# Patient Record
Sex: MALE | Race: WHITE | NOT HISPANIC OR LATINO | Employment: FULL TIME | URBAN - METROPOLITAN AREA
[De-identification: names, ages, dates, MRNs, and addresses within clinical notes are randomized per-mention and may not be internally consistent; named-entity substitution may affect disease eponyms.]

---

## 2018-03-16 ENCOUNTER — PROCEDURE VISIT (OUTPATIENT)
Dept: UROLOGY | Facility: MEDICAL CENTER | Age: 53
End: 2018-03-16
Payer: COMMERCIAL

## 2018-03-16 VITALS
HEIGHT: 72 IN | DIASTOLIC BLOOD PRESSURE: 86 MMHG | WEIGHT: 200 LBS | BODY MASS INDEX: 27.09 KG/M2 | SYSTOLIC BLOOD PRESSURE: 154 MMHG

## 2018-03-16 DIAGNOSIS — N40.0 BPH WITHOUT OBSTRUCTION/LOWER URINARY TRACT SYMPTOMS: ICD-10-CM

## 2018-03-16 DIAGNOSIS — C67.0 MALIGNANT NEOPLASM OF TRIGONE OF BLADDER (HCC): Primary | ICD-10-CM

## 2018-03-16 LAB
SL AMB  POCT GLUCOSE, UA: NEGATIVE
SL AMB LEUKOCYTE ESTERASE,UA: NEGATIVE
SL AMB POCT BILIRUBIN,UA: NEGATIVE
SL AMB POCT BLOOD,UA: NEGATIVE
SL AMB POCT CLARITY,UA: CLEAR
SL AMB POCT COLOR,UA: YELLOW
SL AMB POCT KETONES,UA: NEGATIVE
SL AMB POCT NITRITE,UA: NEGATIVE
SL AMB POCT PH,UA: 7
SL AMB POCT SPECIFIC GRAVITY,UA: 1.01
SL AMB POCT URINE PROTEIN: NEGATIVE
SL AMB POCT UROBILINOGEN: 0.2

## 2018-03-16 PROCEDURE — 52000 CYSTOURETHROSCOPY: CPT | Performed by: UROLOGY

## 2018-03-16 PROCEDURE — 99213 OFFICE O/P EST LOW 20 MIN: CPT | Performed by: UROLOGY

## 2018-03-16 PROCEDURE — 81003 URINALYSIS AUTO W/O SCOPE: CPT | Performed by: UROLOGY

## 2018-03-16 NOTE — PROGRESS NOTES
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    Plan:  1  Fulguration of tumor using instillation of local anesthesia (Xylocaine 1%, 30 mL) into the bladder       No problem-specific Assessment & Plan notes found for this encounter  Diagnoses and all orders for this visit:    Malignant neoplasm of trigone of bladder ([facility] [address] )  -     POCT urine dip auto non-scope          Subjective:      Patient ID: Enriqueta Hill is a 45 y o  male  HPI    Transitional cell carcinoma of the urinary bladder:  The patient has a history of superficial well-differentiated transitional cell carcinoma  His 1st tumor current in 2015  He had a few tiny recurrences a year ago and on cystoscopy today has another very small recurrence  The patient has no signs or symptoms of recurrent tumor  He has no urinary complaints  A cystoscopy report appears below  BPH:  The patient has no significant urinary complaints  He has not had any problems with bleeding, infection or incontinence  He is not taking any medication for relief of urinary symptoms  The following portions of the patient's history were reviewed and updated as appropriate: allergies, current medications, past family history, past medical history, past social history, past surgical history and problem list     Review of Systems   Constitutional: Negative for activity change and fatigue  Respiratory: Negative for shortness of breath and wheezing  Cardiovascular: Negative for chest pain  Gastrointestinal: Negative for abdominal pain  Genitourinary: Negative for difficulty urinating, dysuria, frequency, hematuria and urgency  Musculoskeletal: Negative for back pain and gait problem  Skin: Negative  Allergic/Immunologic: Negative  Neurological: Negative  Psychiatric/Behavioral: Negative            Objective:      /86 (BP Location: Left arm, Patient Position: Sitting, Cuff Size: Standard)   Ht 6' (1 829 m)   Wt 90 7 kg (200 lb)   BMI 27 12 kg/m²          Physical Exam   Constitutional: He is oriented to person, place, and time  He appears well-developed and well-nourished  HENT:   Head: Normocephalic and atraumatic  Neck: Normal range of motion  Neck supple  Pulmonary/Chest: Effort normal    Musculoskeletal: Normal range of motion  Neurological: He is alert and oriented to person, place, and time  He has normal reflexes  Skin: Skin is warm and dry  Psychiatric: He has a normal mood and affect  His behavior is normal  Judgment and thought content normal              MALE FLEXIBLE CYSTOSCOPY    The patient was prepped and draped in sterile and 10 mL of 1% xylocaine jelly was instilled into the urethra  A penile clamp was applied        Penile Urethra:normal  Bulbar Urethra:normal  Prostate:  Not occlusive  Bladder:        Bladder neck normal       Ureteral orifices normal       Mucosa 2 mm polypoid recurrence on the floor of the bladder in the midline well behind the interureteric ridge       Trabeculation minimal    Impression:  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

## 2018-03-16 NOTE — PROGRESS NOTES
IPSS Questionnaire (AUA-7): Over the past month    1)  How often have you had a sensation of not emptying your bladder completely after you finish urinating? 0 - Not at all   2)  How often have you had to urinate again less than two hours after you finished urinating? 1 - Less than 1 time in 5   3)  How often have you found you stopped and started again several times when you urinated? 0 - Not at all   4) How difficult have you found it to postpone urination? 0 - Not at all   5) How often have you had a weak urinary stream?  0 - Not at all   6) How often have you had to push or strain to begin urination? 0 - Not at all   7) How many times did you most typically get up to urinate from the time you went to bed until the time you got up in the morning? 1 - 1 time   Total Score:  2     QOL: Delighted

## 2018-03-16 NOTE — LETTER
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    Plan:  1  Fulguration of tumor using instillation of local anesthesia (Xylocaine 1%, 30 mL) into the bladder       No problem-specific Assessment & Plan notes found for this encounter  Diagnoses and all orders for this visit:    Malignant neoplasm of trigone of bladder ([facility] [address] )  -     POCT urine dip auto non-scope          Subjective:      Patient ID: Enriqueta Hill is a 45 y o  male  HPI    The patient has a history of superficial well-differentiated transitional cell carcinoma  His 1st tumor current in 2015  He had a few tiny recurrences a year ago and on cystoscopy today has another very small recurrence  The patient has no signs or symptoms of recurrent tumor  He has no urinary complaints  A cystoscopy report appears below  The following portions of the patient's history were reviewed and updated as appropriate: allergies, current medications, past family history, past medical history, past social history, past surgical history and problem list     Review of Systems   Constitutional: Negative for activity change and fatigue  Respiratory: Negative for shortness of breath and wheezing  Cardiovascular: Negative for chest pain  Gastrointestinal: Negative for abdominal pain     Genitourinary: Negative for difficulty urinating, dysuria, frequency, hematuria and urgency  Musculoskeletal: Negative for back pain and gait problem  Skin: Negative  Allergic/Immunologic: Negative  Neurological: Negative  Psychiatric/Behavioral: Negative  Objective:      /86 (BP Location: Left arm, Patient Position: Sitting, Cuff Size: Standard)   Ht 6' (1 829 m)   Wt 90 7 kg (200 lb)   BMI 27 12 kg/m²           Physical Exam   Constitutional: He is oriented to person, place, and time  He appears well-developed and well-nourished  HENT:   Head: Normocephalic and atraumatic  Neck: Normal range of motion  Neck supple  Pulmonary/Chest: Effort normal    Musculoskeletal: Normal range of motion  Neurological: He is alert and oriented to person, place, and time  He has normal reflexes  Skin: Skin is warm and dry  Psychiatric: He has a normal mood and affect  His behavior is normal  Judgment and thought content normal              MALE FLEXIBLE CYSTOSCOPY    The patient was prepped and draped in sterile and 10 mL of 1% xylocaine jelly was instilled into the urethra  A penile clamp was applied        Penile Urethra:normal  Bulbar Urethra:normal  Prostate:  Not occlusive  Bladder:        Bladder neck normal       Ureteral orifices normal       Mucosa 2 mm polypoid recurrence on the floor of the bladder in the midline well behind the interureteric ridge       Trabeculation minimal    Impression:  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

## 2018-04-27 ENCOUNTER — PROCEDURE VISIT (OUTPATIENT)
Dept: UROLOGY | Facility: MEDICAL CENTER | Age: 53
End: 2018-04-27
Payer: COMMERCIAL

## 2018-04-27 VITALS
BODY MASS INDEX: 27.09 KG/M2 | DIASTOLIC BLOOD PRESSURE: 84 MMHG | WEIGHT: 200 LBS | SYSTOLIC BLOOD PRESSURE: 138 MMHG | HEIGHT: 72 IN

## 2018-04-27 DIAGNOSIS — C67.0 MALIGNANT NEOPLASM OF TRIGONE OF URINARY BLADDER (HCC): Primary | ICD-10-CM

## 2018-04-27 LAB
SL AMB  POCT GLUCOSE, UA: NEGATIVE
SL AMB LEUKOCYTE ESTERASE,UA: NEGATIVE
SL AMB POCT BILIRUBIN,UA: NEGATIVE
SL AMB POCT BLOOD,UA: NEGATIVE
SL AMB POCT CLARITY,UA: CLEAR
SL AMB POCT COLOR,UA: YELLOW
SL AMB POCT KETONES,UA: NEGATIVE
SL AMB POCT NITRITE,UA: NEGATIVE
SL AMB POCT PH,UA: 5.5
SL AMB POCT SPECIFIC GRAVITY,UA: <=1.005
SL AMB POCT URINE PROTEIN: NEGATIVE
SL AMB POCT UROBILINOGEN: 0.2

## 2018-04-27 PROCEDURE — 52224 CYSTOSCOPY AND TREATMENT: CPT | Performed by: UROLOGY

## 2018-04-27 PROCEDURE — 81003 URINALYSIS AUTO W/O SCOPE: CPT | Performed by: UROLOGY

## 2018-04-27 NOTE — LETTER
April 27, 2018     MD Bren [address]oto [address] [address]    Patient: Vanita Armando   YOB: 1965   Date of Visit: 4/27/2018       Dear Dr Saran Krause: Thank you for referring Vanita Armando to me for evaluation  Below are my notes for this consultation  If you have questions, please do not hesitate to call me  I look forward to following your patient along with you  Sincerely,        Madisyn Davila MD        CC: No Recipients  Madisyn Davila MD  4/27/2018  3:13 PM  Sign at close encounter  Assessment/Plan:    Assessment:  1  Recurrent bladder cancer    Plan:  1  Return in 3 months for surveillance cystoscopy and reassessment of bladder cancer    No problem-specific Assessment & Plan notes found for this encounter  Diagnoses and all orders for this visit:    Malignant neoplasm of trigone of urinary bladder (HCC)  -     POCT urine dip auto non-scope          Subjective:      Patient ID: Vanita Armando is a 52 y o  male  HPI    The following portions of the patient's history were reviewed and updated as appropriate: allergies, current medications, past family history, past medical history, past social history, past surgical history and problem list     Review of Systems      Objective:      /84 (BP Location: Left arm, Patient Position: Sitting, Cuff Size: Standard)   Ht 6' (1 829 m)   Wt 90 7 kg (200 lb)   BMI 27 12 kg/m²           Physical Exam      Madisyn Davila MD  4/27/2018  3:12 PM  Sign at close encounter  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    Postoperative diagnosis:  Same    Operation cystoscopy and fulguration of recurrent bladder tumor    Surgeon Mesfin Patino MD, FACS    Anesthesia:  Local      The patient was brought to the procedure room and identified  He was placed in lithotomy position  He was prepped and draped in sterile fashion    The bladder was instilled with local anesthesia  The penile clamp was applied  After satisfactory time interval for the local anesthesia were, a flexible cystoscopy was carried out  The urethra was normal   The prostate showed minimal obstruction  Inspection of the interior of the bladder disclosed a normal trigone  There was a 2 mm papillary tumor just behind the interureteric ridge in the midline  Using the Bugbee electrode, the polypoid lesion was vaporized  The procedure was completed  The patient tolerated well  There was no blood loss  He was sent home in satisfactory condition

## 2018-04-27 NOTE — PROGRESS NOTES
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    Postoperative diagnosis:  Same    Operation cystoscopy and fulguration of recurrent bladder tumor    Surgeon Netta Guzman MD, [facility]    Anesthesia:  Local      The patient was brought to the procedure room and identified  He was placed in lithotomy position  He was prepped and draped in sterile fashion  The bladder was instilled with local anesthesia  The penile clamp was applied  After satisfactory time interval for the local anesthesia were, a flexible cystoscopy was carried out  The urethra was normal   The prostate showed minimal obstruction  Inspection of the interior of the bladder disclosed a normal trigone  There was a 2 mm papillary tumor just behind the interureteric ridge in the midline  Using the Bugbee electrode, the polypoid lesion was vaporized  The procedure was completed  The patient tolerated well  There was no blood loss  He was sent home in satisfactory condition

## 2018-04-27 NOTE — PATIENT INSTRUCTIONS
Bladder Cancer   AMBULATORY CARE:   Bladder cancer  starts in the cells that line your bladder  Common symptoms include the following:   · Blood in your urine or urine that is pink or orange    · A sudden need to urinate, or urinating more often than usual    · Trouble starting the stream of urine or urinating very little    · Pain or burning when you urinate    · Pain in your abdomen or pelvis     · Unexplained weight loss    · Feeling tired or weak  Call 911 for any of the following:   · You suddenly feel lightheaded and short of breath  · You cough up blood  Seek care immediately if:   · Your arm or leg feels warm, tender, and painful  It may look swollen and red  · You are unable to urinate  Contact your healthcare provider or oncologist if:   · You have a fever  · You vomit and cannot keep any liquids or food down  · You have new or worsening pain  · Your pain gets worse or does not go away after you take pain medicine  · You have questions or concerns about your condition or care  Treatment for bladder cancer  may include any of the following:  · Transurethral resection of bladder tumor (TURBT)  is a procedure used to remove the tumor  Bladder muscle near the tumor may also be removed  This procedure is done by inserting tools through your urethra and into your bladder  · Immunotherapy  is medicine given to help your immune system kill cancer cells  It is injected into your vein or directly into your bladder  · Chemotherapy  is medicine given to kill cancer cells  It may be given to you as a pill or an injection into your vein or muscle  It may also be injected directly into your bladder  · Radiation therapy  uses high-energy x-ray beams to kill cancer cells  · Surgery  may be needed to remove your bladder  Surrounding organs and lymph nodes may also be removed  Self-care:   · Do not smoke    Nicotine can damage blood vessels and make it hard to manage your bladder cancer  Smoking also increases your risk for new or returning cancer  Ask your healthcare provider for information if you currently smoke and need help to quit  E-cigarettes or smokeless tobacco still contain nicotine  Talk to your healthcare provider before you use these products  · Limit or do not drink alcohol  Alcohol may cause you to become dehydrated  Ask your oncologist if it is safe for you to drink alcohol, and how much is safe to drink  · Eat healthy foods  Healthy foods include fruit, vegetables, whole-grain breads, low-fat dairy products, beans, lean meats, and fish  Your healthcare provider may recommend that you eat less red meat  You need to eat enough calories to help prevent weight loss and increase your energy level  You also need protein to give you strength  If you do not feel hungry, eat small amounts often instead of large meals  · Drink liquids as directed  You may need to drink more liquids than usual to prevent dehydration  Ask how much liquid to drink each day and which liquids are best for you  · Exercise as directed  Exercise may help increase your energy level and appetite  Ask your healthcare provider how much exercise you need and which exercises are best for you  For more information and support: It may be difficult for you and your family to go through cancer and cancer treatments  Join a support group or talk with others who have gone through treatment  · [address] [place] [address]  Phone: 7- [phone]  Web Address: http://[employer]/  [employer]  · [address] [place] [address]  Phone: 0- 995 - [phone]  Web Address: http://[employer]/  gov  Follow up with your healthcare provider or oncologist as directed: You will need to see your oncologist for ongoing treatment  Write down your questions so you remember to ask them during your visits    © 2017 [facility] is for End User's use only and may not be sold, redistributed or otherwise used for commercial purposes  All illustrations and images included in CareNotes® are the copyrighted property of A D A M , Inc  or [employer]  The above information is an  only  It is not intended as medical advice for individual conditions or treatments  Talk to your doctor, nurse or pharmacist before following any medical regimen to see if it is safe and effective for you

## 2018-04-27 NOTE — PROGRESS NOTES
Assessment/Plan:    Assessment:  1  Recurrent bladder cancer    Plan:  1  Return in 3 months for surveillance cystoscopy and reassessment of bladder cancer    No problem-specific Assessment & Plan notes found for this encounter  Diagnoses and all orders for this visit:    Malignant neoplasm of trigone of urinary bladder (HCC)  -     POCT urine dip auto non-scope          Subjective:      Patient ID: Dinora Lim is a 52 y o  male      HPI    The following portions of the patient's history were reviewed and updated as appropriate: allergies, current medications, past family history, past medical history, past social history, past surgical history and problem list     Review of Systems      Objective:      /84 (BP Location: Left arm, Patient Position: Sitting, Cuff Size: Standard)   Ht 6' (1 829 m)   Wt 90 7 kg (200 lb)   BMI 27 12 kg/m²          Physical Exam

## 2018-07-26 ENCOUNTER — PROCEDURE VISIT (OUTPATIENT)
Dept: UROLOGY | Facility: MEDICAL CENTER | Age: 53
End: 2018-07-26
Payer: COMMERCIAL

## 2018-07-26 VITALS
WEIGHT: 204 LBS | BODY MASS INDEX: 27.63 KG/M2 | DIASTOLIC BLOOD PRESSURE: 86 MMHG | SYSTOLIC BLOOD PRESSURE: 158 MMHG | HEIGHT: 72 IN

## 2018-07-26 DIAGNOSIS — Z71.89 OTHER SPECIFIED COUNSELING: ICD-10-CM

## 2018-07-26 DIAGNOSIS — C67.0 MALIGNANT NEOPLASM OF TRIGONE OF URINARY BLADDER (HCC): Primary | ICD-10-CM

## 2018-07-26 PROBLEM — D49.4 BLADDER TUMOR: Status: ACTIVE | Noted: 2018-07-26

## 2018-07-26 LAB
SL AMB  POCT GLUCOSE, UA: NEGATIVE
SL AMB LEUKOCYTE ESTERASE,UA: NEGATIVE
SL AMB POCT BILIRUBIN,UA: NEGATIVE
SL AMB POCT BLOOD,UA: NEGATIVE
SL AMB POCT CLARITY,UA: CLEAR
SL AMB POCT COLOR,UA: YELLOW
SL AMB POCT KETONES,UA: NEGATIVE
SL AMB POCT NITRITE,UA: NEGATIVE
SL AMB POCT PH,UA: 6
SL AMB POCT SPECIFIC GRAVITY,UA: 1.01
SL AMB POCT URINE PROTEIN: NEGATIVE
SL AMB POCT UROBILINOGEN: 0.2

## 2018-07-26 PROCEDURE — 52000 CYSTOURETHROSCOPY: CPT | Performed by: UROLOGY

## 2018-07-26 PROCEDURE — 99213 OFFICE O/P EST LOW 20 MIN: CPT | Performed by: UROLOGY

## 2018-07-26 PROCEDURE — 81003 URINALYSIS AUTO W/O SCOPE: CPT | Performed by: UROLOGY

## 2018-07-26 NOTE — PROGRESS NOTES
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    Operation:  Flexible cystoscopy    Surgeon:  Nathalie Pizano MD,[facility]    Anesthesia:  Xylocaine jelly    Procedure:  Patient was brought to the cystoscopy room and positively identified  The patient was prepped and draped in sterile fashion  Ten mL of 1% xylocaine jelly was instilled into the urethra  A penile clamp was applied  The flexible cystoscope was inserted  Findings are as follows:    Penile Urethra:normal  Bulbar Urethra:normal  Prostate:   Minimally occlusive  Bladder: The bladder neck is normal  Ureteral orifices are in normal  position  The mucosa is   Normal except for 3 papillary tumors near the midline between the bladder neck in interureteric ridge  The largest of these is approximately 5 mm      There is mild trabeculation  The cystoscope was removed  The patient tolerated the procedure well  Following additional consultation to review the findings with the patient, he was discharged from the office in satisfactory condition  Impression:  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

## 2018-07-26 NOTE — H&P
IPSS Questionnaire (AUA-7): Over the past month    1)  How often have you had a sensation of not emptying your bladder completely after you finish urinating? 0 - Not at all   2)  How often have you had to urinate again less than two hours after you finished urinating? 1 - Less than 1 time in 5   3)  How often have you found you stopped and started again several times when you urinated? 0 - Not at all   4) How difficult have you found it to postpone urination? 0 - Not at all   5) How often have you had a weak urinary stream?  0 - Not at all   6) How often have you had to push or strain to begin urination? 0 - Not at all   7) How many times did you most typically get up to urinate from the time you went to bed until the time you got up in the morning? 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    Operation:  Flexible cystoscopy    Surgeon:  Dorian Gosselin, MD,FACS    Anesthesia:  Xylocaine jelly    Procedure:  Patient was brought to the cystoscopy room and positively identified  The patient was prepped and draped in sterile fashion  Ten mL of 1% xylocaine jelly was instilled into the urethra  A penile clamp was applied  The flexible cystoscope was inserted  Findings are as follows:    Penile Urethra:normal  Bulbar Urethra:normal  Prostate:   Minimally occlusive  Bladder: The bladder neck is normal  Ureteral orifices are in normal  position  The mucosa is   Normal except for 3 papillary tumors near the midline between the bladder neck in interureteric ridge  The largest of these is approximately 5 mm      There is mild trabeculation  The cystoscope was removed  The patient tolerated the procedure well    Following additional consultation to review the findings with the patient, he was discharged from the office in satisfactory condition  Impression:  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  Assessment/Plan:    Malignant neoplasm of trigone of urinary bladder ([address] )   The patient has several small recurrent bladder tumors  He will be scheduled for bladder biopsy and fulguration  Following recovery, we will discuss BCG treatment  Diagnoses and all orders for this visit:    Malignant neoplasm of trigone of urinary bladder (HCC)  -     POCT urine dip auto non-scope    Other specified counseling          Subjective:      Patient ID: Hailey Montero is a 52 y o  male  HPI   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:   The patient returns for surveillance cystoscopy today  He has 3 small recurrences  Three months ago, a single tiny recurrence was fulgurated in the office  These lesions are a bit larger in because your more numerous, I think it would be easier on the patient to perform the bladder biopsy and fulguration in the hospital under anesthesia  The patient is recurrences are becoming more frequent and more numerous  Following treatment for the current back, we will discuss a course of BCG  The following portions of the patient's history were reviewed and updated as appropriate: allergies, current medications, past family history, past medical history, past social history, past surgical history and problem list     Review of Systems   Constitutional: Negative for activity change and fatigue  Respiratory: Negative for shortness of breath and wheezing  Cardiovascular: Negative for chest pain  Gastrointestinal: Negative for abdominal pain  Genitourinary: Negative for difficulty urinating, dysuria, frequency, hematuria and urgency  Musculoskeletal: Negative for back pain and gait problem  Skin: Negative  Allergic/Immunologic: Negative  Neurological: Negative  Psychiatric/Behavioral: Negative  Objective:      /86 (BP Location: Left arm, Patient Position: Sitting, Cuff Size: Standard)   Ht 6' (1 829 m)   Wt 92 5 kg (204 lb)   BMI 27 67 kg/m²           Physical Exam   Constitutional: He is oriented to person, place, and time  He appears well-developed and well-nourished  HENT:   Head: Normocephalic and atraumatic  Eyes: EOM are normal    Neck: Normal range of motion  Neck supple  Cardiovascular: Normal rate, regular rhythm and normal heart sounds  Pulmonary/Chest: Effort normal and breath sounds normal    Abdominal: Soft  There is no tenderness  Genitourinary: Penis normal    Genitourinary Comments:   External genitalia normal   Musculoskeletal: Normal range of motion  Neurological: He is alert and oriented to person, place, and time  Skin: Skin is warm and dry  Psychiatric: He has a normal mood and affect   His behavior is normal  Judgment and thought content normal

## 2018-07-26 NOTE — LETTER
July 26, 2018     Christine Liao MD  [facility] [address]    Patient: Robinson Jackson   YOB: 1965   Date of Visit: 7/26/2018       Dear Dr Giuliano Cha: Thank you for referring Robinson Jackson to me for evaluation  Below are my notes for this consultation  If you have questions, please do not hesitate to call me  I look forward to following your patient along with you  Sincerely,        Gloria Martin MD        CC: No Recipients  Gloria Martin MD  7/26/2018 12:15 PM  Sign at close encounter  Assessment/Plan:    Malignant neoplasm of trigone of urinary bladder 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:   The patient returns for surveillance cystoscopy today  He has 3 small recurrences  Three months ago, a single tiny recurrence was fulgurated in the office  These lesions are a bit larger in because your more numerous, I think it would be easier on the patient to perform the bladder biopsy and fulguration in the hospital under anesthesia  The patient is recurrences are becoming more frequent and more numerous  Following treatment for the current back, we will discuss a course of BCG  The following portions of the patient's history were reviewed and updated as appropriate: allergies, current medications, past family history, past medical history, past social history, past surgical history and problem list     Review of Systems   Constitutional: Negative for activity change and fatigue     Respiratory: Negative for shortness of breath and wheezing  Cardiovascular: Negative for chest pain  Gastrointestinal: Negative for abdominal pain  Genitourinary: Negative for difficulty urinating, dysuria, frequency, hematuria and urgency  Musculoskeletal: Negative for back pain and gait problem  Skin: Negative  Allergic/Immunologic: Negative  Neurological: Negative  Psychiatric/Behavioral: Negative  Objective:      /86 (BP Location: Left arm, Patient Position: Sitting, Cuff Size: Standard)   Ht 6' (1 829 m)   Wt 92 5 kg (204 lb)   BMI 27 67 kg/m²           Physical Exam   Constitutional: He is oriented to person, place, and time  He appears well-developed and well-nourished  HENT:   Head: Normocephalic and atraumatic  Eyes: EOM are normal    Neck: Normal range of motion  Neck supple  Cardiovascular: Normal rate, regular rhythm and normal heart sounds  Pulmonary/Chest: Effort normal and breath sounds normal    Abdominal: Soft  There is no tenderness  Genitourinary: Penis normal    Genitourinary Comments:   External genitalia normal   Musculoskeletal: Normal range of motion  Neurological: He is alert and oriented to person, place, and time  Skin: Skin is warm and dry  Psychiatric: He has a normal mood and affect  His behavior is normal  Judgment and thought content normal          Sunday MD Abiola  7/26/2018 11:26 AM  Sign at close encounter  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    Operation:  Flexible cystoscopy    Surgeon:  Jose G Sawyer MD,FACS    Anesthesia:  Xylocaine jelly    Procedure:  Patient was brought to the cystoscopy room and positively identified  The patient was prepped and draped in sterile fashion  Ten mL of 1% xylocaine jelly was instilled into the urethra    A penile clamp was applied  The flexible cystoscope was inserted  Findings are as follows:    Penile Urethra:normal  Bulbar Urethra:normal  Prostate:   Minimally occlusive  Bladder: The bladder neck is normal  Ureteral orifices are in normal  position  The mucosa is   Normal except for 3 papillary tumors near the midline between the bladder neck in interureteric ridge  The largest of these is approximately 5 mm      There is mild trabeculation  The cystoscope was removed  The patient tolerated the procedure well  Following additional consultation to review the findings with the patient, he was discharged from the office in satisfactory condition  Impression:  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

## 2018-07-26 NOTE — ASSESSMENT & PLAN NOTE
The patient has several small recurrent bladder tumors  He will be scheduled for bladder biopsy and fulguration  Following recovery, we will discuss BCG treatment

## 2018-07-26 NOTE — PROGRESS NOTES
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:   The patient returns for surveillance cystoscopy today  He has 3 small recurrences  Three months ago, a single tiny recurrence was fulgurated in the office  These lesions are a bit larger in because your more numerous, I think it would be easier on the patient to perform the bladder biopsy and fulguration in the hospital under anesthesia  The patient is recurrences are becoming more frequent and more numerous  Following treatment for the current back, we will discuss a course of BCG  The following portions of the patient's history were reviewed and updated as appropriate: allergies, current medications, past family history, past medical history, past social history, past surgical history and problem list     Review of Systems   Constitutional: Negative for activity change and fatigue  Respiratory: Negative for shortness of breath and wheezing  Cardiovascular: Negative for chest pain  Gastrointestinal: Negative for abdominal pain  Genitourinary: Negative for difficulty urinating, dysuria, frequency, hematuria and urgency  Musculoskeletal: Negative for back pain and gait problem  Skin: Negative  Allergic/Immunologic: Negative  Neurological: Negative  Psychiatric/Behavioral: Negative            Objective:      /86 (BP Location: Left arm, Patient Position: Sitting, Cuff Size: Standard)   Ht 6' (1 829 m)   Wt 92 5 kg (204 lb) BMI 27 67 kg/m²          Physical Exam   Constitutional: He is oriented to person, place, and time  He appears well-developed and well-nourished  HENT:   Head: Normocephalic and atraumatic  Eyes: EOM are normal    Neck: Normal range of motion  Neck supple  Cardiovascular: Normal rate, regular rhythm and normal heart sounds  Pulmonary/Chest: Effort normal and breath sounds normal    Abdominal: Soft  There is no tenderness  Genitourinary: Penis normal    Genitourinary Comments:   External genitalia normal   Musculoskeletal: Normal range of motion  Neurological: He is alert and oriented to person, place, and time  Skin: Skin is warm and dry  Psychiatric: He has a normal mood and affect   His behavior is normal  Judgment and thought content normal

## 2018-08-07 ENCOUNTER — ANESTHESIA EVENT (OUTPATIENT)
Dept: PERIOP | Facility: HOSPITAL | Age: 53
End: 2018-08-07
Payer: COMMERCIAL

## 2018-08-07 RX ORDER — SODIUM CHLORIDE 9 MG/ML
125 INJECTION, SOLUTION INTRAVENOUS CONTINUOUS
Status: CANCELLED | OUTPATIENT
Start: 2018-08-07 | End: 2019-08-07

## 2018-08-08 ENCOUNTER — APPOINTMENT (OUTPATIENT)
Dept: PREADMISSION TESTING | Facility: HOSPITAL | Age: 53
End: 2018-08-08
Payer: COMMERCIAL

## 2018-08-08 ENCOUNTER — APPOINTMENT (OUTPATIENT)
Dept: LAB | Facility: HOSPITAL | Age: 53
End: 2018-08-08
Attending: UROLOGY
Payer: COMMERCIAL

## 2018-08-08 ENCOUNTER — HOSPITAL ENCOUNTER (OUTPATIENT)
Dept: NON INVASIVE DIAGNOSTICS | Facility: HOSPITAL | Age: 53
Discharge: HOME | End: 2018-08-08
Attending: UROLOGY
Payer: COMMERCIAL

## 2018-08-08 DIAGNOSIS — D49.4 BLADDER TUMOR: ICD-10-CM

## 2018-08-08 LAB
ALBUMIN SERPL BCP-MCNC: 4.3 G/DL (ref 3.5–5)
ALP SERPL-CCNC: 44 U/L (ref 46–116)
ALT SERPL W P-5'-P-CCNC: 58 U/L (ref 12–78)
ANION GAP SERPL CALCULATED.3IONS-SCNC: 7 MMOL/L (ref 4–13)
APTT PPP: 26 SECONDS (ref 24–36)
AST SERPL W P-5'-P-CCNC: 34 U/L (ref 5–45)
BASOPHILS # BLD AUTO: 0.03 THOUSANDS/ΜL (ref 0–0.1)
BASOPHILS NFR BLD AUTO: 0 % (ref 0–1)
BILIRUB SERPL-MCNC: 0.46 MG/DL (ref 0.2–1)
BUN SERPL-MCNC: 25 MG/DL (ref 5–25)
CALCIUM SERPL-MCNC: 9.6 MG/DL (ref 8.3–10.1)
CHLORIDE SERPL-SCNC: 100 MMOL/L (ref 100–108)
CO2 SERPL-SCNC: 29 MMOL/L (ref 21–32)
CREAT SERPL-MCNC: 1.16 MG/DL (ref 0.6–1.3)
EOSINOPHIL # BLD AUTO: 0.1 THOUSAND/ΜL (ref 0–0.61)
EOSINOPHIL NFR BLD AUTO: 2 % (ref 0–6)
ERYTHROCYTE [DISTWIDTH] IN BLOOD BY AUTOMATED COUNT: 12.8 % (ref 11.6–15.1)
GFR SERPL CREATININE-BSD FRML MDRD: 70 ML/MIN/1.73SQ M
GLUCOSE SERPL-MCNC: 111 MG/DL (ref 65–140)
HCT VFR BLD AUTO: 40.7 % (ref 36.5–49.3)
HGB BLD-MCNC: 13.8 G/DL (ref 12–17)
INR PPP: 1.04 (ref 0.86–1.17)
LYMPHOCYTES # BLD AUTO: 1.76 THOUSANDS/ΜL (ref 0.6–4.47)
LYMPHOCYTES NFR BLD AUTO: 26 % (ref 14–44)
MCH RBC QN AUTO: 31 PG (ref 26.8–34.3)
MCHC RBC AUTO-ENTMCNC: 33.9 G/DL (ref 31.4–37.4)
MCV RBC AUTO: 92 FL (ref 82–98)
MONOCYTES # BLD AUTO: 0.68 THOUSAND/ΜL (ref 0.17–1.22)
MONOCYTES NFR BLD AUTO: 10 % (ref 4–12)
NEUTROPHILS # BLD AUTO: 4.31 THOUSANDS/ΜL (ref 1.85–7.62)
NEUTS SEG NFR BLD AUTO: 63 % (ref 43–75)
NRBC BLD AUTO-RTO: 0 /100 WBCS
PLATELET # BLD AUTO: 259 THOUSANDS/UL (ref 149–390)
PMV BLD AUTO: 10.9 FL (ref 8.9–12.7)
POTASSIUM SERPL-SCNC: 4.1 MMOL/L (ref 3.5–5.3)
PROT SERPL-MCNC: 7.8 G/DL (ref 6.4–8.2)
PROTHROMBIN TIME: 13.7 SECONDS (ref 11.8–14.2)
RBC # BLD AUTO: 4.45 MILLION/UL (ref 3.88–5.62)
SODIUM SERPL-SCNC: 136 MMOL/L (ref 136–145)
WBC # BLD AUTO: 6.88 THOUSAND/UL (ref 4.31–10.16)

## 2018-08-08 PROCEDURE — 36415 COLL VENOUS BLD VENIPUNCTURE: CPT

## 2018-08-08 PROCEDURE — 85730 THROMBOPLASTIN TIME PARTIAL: CPT

## 2018-08-08 PROCEDURE — 80053 COMPREHEN METABOLIC PANEL: CPT

## 2018-08-08 PROCEDURE — 93005 ELECTROCARDIOGRAM TRACING: CPT | Performed by: UROLOGY

## 2018-08-08 PROCEDURE — 87086 URINE CULTURE/COLONY COUNT: CPT

## 2018-08-08 PROCEDURE — 85610 PROTHROMBIN TIME: CPT

## 2018-08-08 PROCEDURE — 85025 COMPLETE CBC W/AUTO DIFF WBC: CPT

## 2018-08-08 RX ORDER — SODIUM CHLORIDE 9 MG/ML
125 INJECTION, SOLUTION INTRAVENOUS CONTINUOUS
Status: CANCELLED | OUTPATIENT
Start: 2018-08-16 | End: 2019-08-16

## 2018-08-08 NOTE — ANESTHESIA PREPROCEDURE EVALUATION
Review of Systems/Medical History  Patient summary reviewed        Cardiovascular  Negative cardio ROS    Pulmonary  Negative pulmonary ROS        GI/Hepatic  Negative GI/hepatic ROS   GERD well controlled,        Negative  ROS        Endo/Other  Negative endo/other ROS      GYN  Negative gynecology ROS          Hematology  Negative hematology ROS      Musculoskeletal  Negative musculoskeletal ROS        Neurology  Negative neurology ROS      Psychology   Negative psychology ROS              Physical Exam    Airway    Mallampati score: II  TM Distance: >3 FB  Neck ROM: full     Dental   No notable dental hx     Cardiovascular  Comment: Negative ROS, Rhythm: regular, Rate: normal, Cardiovascular exam normal    Pulmonary  Pulmonary exam normal Breath sounds clear to auscultation,     Other Findings        Anesthesia Plan  ASA Score- 2     Anesthesia Type- general with ASA Monitors  Additional Monitors:   Airway Plan: LMA  Plan Factors-    Induction- intravenous  Postoperative Plan-     Informed Consent- Anesthetic plan and risks discussed with patient

## 2018-08-09 LAB
ATRIAL RATE: 60 BPM
P AXIS: 38 DEGREES
PR INTERVAL: 156 MS
QRS AXIS: -8 DEGREES
QRSD INTERVAL: 94 MS
QT INTERVAL: 384 MS
QTC INTERVAL: 384 MS
T WAVE AXIS: -1 DEGREES
VENTRICULAR RATE: 60 BPM

## 2018-08-09 PROCEDURE — 93010 ELECTROCARDIOGRAM REPORT: CPT | Performed by: UROLOGY

## 2018-08-10 LAB — BACTERIA UR CULT: NORMAL

## 2018-08-15 RX ORDER — SODIUM CHLORIDE 9 MG/ML
125 INJECTION, SOLUTION INTRAVENOUS CONTINUOUS
Status: DISCONTINUED | OUTPATIENT
Start: 2018-08-15 | End: 2018-08-15

## 2018-08-16 ENCOUNTER — HOSPITAL ENCOUNTER (OUTPATIENT)
Facility: HOSPITAL | Age: 53
Setting detail: OUTPATIENT SURGERY
Discharge: HOME | End: 2018-08-16
Attending: UROLOGY | Admitting: UROLOGY
Payer: COMMERCIAL

## 2018-08-16 ENCOUNTER — ANESTHESIA (OUTPATIENT)
Dept: PERIOP | Facility: HOSPITAL | Age: 53
End: 2018-08-16
Payer: COMMERCIAL

## 2018-08-16 VITALS
WEIGHT: 210 LBS | TEMPERATURE: 98 F | DIASTOLIC BLOOD PRESSURE: 69 MMHG | RESPIRATION RATE: 15 BRPM | BODY MASS INDEX: 28.44 KG/M2 | HEIGHT: 72 IN | OXYGEN SATURATION: 94 % | SYSTOLIC BLOOD PRESSURE: 115 MMHG | HEART RATE: 68 BPM

## 2018-08-16 DIAGNOSIS — D49.4 BLADDER TUMOR: ICD-10-CM

## 2018-08-16 PROCEDURE — 52224 CYSTOSCOPY AND TREATMENT: CPT | Performed by: UROLOGY

## 2018-08-16 PROCEDURE — 88305 TISSUE EXAM BY PATHOLOGIST: CPT | Performed by: PATHOLOGY

## 2018-08-16 RX ORDER — OXYCODONE HYDROCHLORIDE 5 MG/1
5 TABLET ORAL EVERY 4 HOURS PRN
Status: DISCONTINUED | OUTPATIENT
Start: 2018-08-16 | End: 2018-08-16 | Stop reason: HOSPADM

## 2018-08-16 RX ORDER — ACETAMINOPHEN 325 MG/1
650 TABLET ORAL EVERY 6 HOURS PRN
Status: DISCONTINUED | OUTPATIENT
Start: 2018-08-16 | End: 2018-08-16 | Stop reason: HOSPADM

## 2018-08-16 RX ORDER — KETOROLAC TROMETHAMINE 30 MG/ML
INJECTION, SOLUTION INTRAMUSCULAR; INTRAVENOUS AS NEEDED
Status: DISCONTINUED | OUTPATIENT
Start: 2018-08-16 | End: 2018-08-16 | Stop reason: SURG

## 2018-08-16 RX ORDER — MAGNESIUM HYDROXIDE 1200 MG/15ML
LIQUID ORAL AS NEEDED
Status: DISCONTINUED | OUTPATIENT
Start: 2018-08-16 | End: 2018-08-16 | Stop reason: HOSPADM

## 2018-08-16 RX ORDER — ONDANSETRON 2 MG/ML
4 INJECTION INTRAMUSCULAR; INTRAVENOUS EVERY 6 HOURS PRN
Status: CANCELLED | OUTPATIENT
Start: 2018-08-16

## 2018-08-16 RX ORDER — PHENAZOPYRIDINE HYDROCHLORIDE 200 MG/1
200 TABLET, FILM COATED ORAL
Status: DISCONTINUED | OUTPATIENT
Start: 2018-08-16 | End: 2018-08-16 | Stop reason: HOSPADM

## 2018-08-16 RX ORDER — ONDANSETRON 2 MG/ML
INJECTION INTRAMUSCULAR; INTRAVENOUS AS NEEDED
Status: DISCONTINUED | OUTPATIENT
Start: 2018-08-16 | End: 2018-08-16 | Stop reason: SURG

## 2018-08-16 RX ORDER — SODIUM CHLORIDE 9 MG/ML
125 INJECTION, SOLUTION INTRAVENOUS CONTINUOUS
Status: DISCONTINUED | OUTPATIENT
Start: 2018-08-16 | End: 2018-08-16 | Stop reason: HOSPADM

## 2018-08-16 RX ORDER — PROPOFOL 10 MG/ML
INJECTION, EMULSION INTRAVENOUS AS NEEDED
Status: DISCONTINUED | OUTPATIENT
Start: 2018-08-16 | End: 2018-08-16 | Stop reason: SURG

## 2018-08-16 RX ORDER — MORPHINE SULFATE 2 MG/ML
2 INJECTION, SOLUTION INTRAMUSCULAR; INTRAVENOUS
Status: DISCONTINUED | OUTPATIENT
Start: 2018-08-16 | End: 2018-08-16 | Stop reason: HOSPADM

## 2018-08-16 RX ORDER — FENTANYL CITRATE 50 UG/ML
50 INJECTION, SOLUTION INTRAMUSCULAR; INTRAVENOUS
Status: CANCELLED | OUTPATIENT
Start: 2018-08-16

## 2018-08-16 RX ORDER — MIDAZOLAM HYDROCHLORIDE 1 MG/ML
INJECTION INTRAMUSCULAR; INTRAVENOUS AS NEEDED
Status: DISCONTINUED | OUTPATIENT
Start: 2018-08-16 | End: 2018-08-16 | Stop reason: SURG

## 2018-08-16 RX ORDER — FENTANYL CITRATE/PF 50 MCG/ML
25 SYRINGE (ML) INJECTION
Status: DISCONTINUED | OUTPATIENT
Start: 2018-08-16 | End: 2018-08-16 | Stop reason: HOSPADM

## 2018-08-16 RX ORDER — ONDANSETRON 2 MG/ML
4 INJECTION INTRAMUSCULAR; INTRAVENOUS ONCE
Status: DISCONTINUED | OUTPATIENT
Start: 2018-08-16 | End: 2018-08-16 | Stop reason: HOSPADM

## 2018-08-16 RX ORDER — ONDANSETRON 2 MG/ML
4 INJECTION INTRAMUSCULAR; INTRAVENOUS EVERY 6 HOURS PRN
Status: DISCONTINUED | OUTPATIENT
Start: 2018-08-16 | End: 2018-08-16 | Stop reason: HOSPADM

## 2018-08-16 RX ORDER — FENTANYL CITRATE 50 UG/ML
INJECTION, SOLUTION INTRAMUSCULAR; INTRAVENOUS AS NEEDED
Status: DISCONTINUED | OUTPATIENT
Start: 2018-08-16 | End: 2018-08-16 | Stop reason: SURG

## 2018-08-16 RX ORDER — KETOROLAC TROMETHAMINE 30 MG/ML
15 INJECTION, SOLUTION INTRAMUSCULAR; INTRAVENOUS EVERY 6 HOURS SCHEDULED
Status: DISCONTINUED | OUTPATIENT
Start: 2018-08-16 | End: 2018-08-16 | Stop reason: HOSPADM

## 2018-08-16 RX ORDER — GLYCOPYRROLATE 0.2 MG/ML
INJECTION INTRAMUSCULAR; INTRAVENOUS AS NEEDED
Status: DISCONTINUED | OUTPATIENT
Start: 2018-08-16 | End: 2018-08-16 | Stop reason: SURG

## 2018-08-16 RX ORDER — PHENAZOPYRIDINE HYDROCHLORIDE 200 MG/1
200 TABLET, FILM COATED ORAL
Qty: 10 TABLET | Refills: 0 | Status: SHIPPED | OUTPATIENT
Start: 2018-08-16 | End: 2018-08-21

## 2018-08-16 RX ADMIN — PROPOFOL 200 MG: 10 INJECTION, EMULSION INTRAVENOUS at 08:34

## 2018-08-16 RX ADMIN — ONDANSETRON HYDROCHLORIDE 4 MG: 2 INJECTION, SOLUTION INTRAVENOUS at 08:54

## 2018-08-16 RX ADMIN — FENTANYL CITRATE 50 MCG: 50 INJECTION, SOLUTION INTRAMUSCULAR; INTRAVENOUS at 08:40

## 2018-08-16 RX ADMIN — CEFAZOLIN SODIUM 2000 MG: 2 SOLUTION INTRAVENOUS at 08:30

## 2018-08-16 RX ADMIN — GLYCOPYRROLATE 0.4 MG: 0.2 INJECTION, SOLUTION INTRAMUSCULAR; INTRAVENOUS at 08:27

## 2018-08-16 RX ADMIN — MIDAZOLAM 2 MG: 1 INJECTION INTRAMUSCULAR; INTRAVENOUS at 08:27

## 2018-08-16 RX ADMIN — KETOROLAC TROMETHAMINE 30 MG: 30 INJECTION, SOLUTION INTRAMUSCULAR at 08:59

## 2018-08-16 RX ADMIN — SODIUM CHLORIDE 125 ML/HR: 0.9 INJECTION, SOLUTION INTRAVENOUS at 09:33

## 2018-08-16 RX ADMIN — SODIUM CHLORIDE 125 ML/HR: 0.9 INJECTION, SOLUTION INTRAVENOUS at 07:10

## 2018-08-16 RX ADMIN — FENTANYL CITRATE 50 MCG: 50 INJECTION, SOLUTION INTRAMUSCULAR; INTRAVENOUS at 08:39

## 2018-08-16 RX ADMIN — PROPOFOL 100 MG: 10 INJECTION, EMULSION INTRAVENOUS at 08:35

## 2018-08-16 RX ADMIN — DEXAMETHASONE SODIUM PHOSPHATE 4 MG: 10 INJECTION INTRAMUSCULAR; INTRAVENOUS at 08:54

## 2018-08-16 NOTE — OP NOTE
OPERATIVE REPORT  PATIENT NAME: Héctor Holloway    :  1965  MRN: [id number]  Pt Location: [facility] OR ROOM 08    SURGERY DATE: 2018    Surgeon(s) and Role:     * Tamanna Carroll MD - Primary    Preop Diagnosis:  Bladder tumor [D49 4]    Post-Op Diagnosis Codes: * Bladder tumor [D49 4]    Procedure(s) (LRB):  CYSTOSCOPY , BLADDER BIOPSIES WITH FULGURATION (N/A)    Specimen(s):  ID Type Source Tests Collected by Time Destination   1 : biopsies of bladder tumors Tissue Urinary Bladder TISSUE EXAM Tamanna Carroll MD 2018 [id number]        Estimated Blood Loss:   Minimal    Drains:  None       Anesthesia Type:   General    Operative Indications:  Bladder tumor [D49 4]      Operative Findings:  Normally urethra, but only bilobar prostatic hypertrophy with an elevated median bar  Prostate a small approximately 15-20 g in size  Ureteral orifices are unremarkable  There is evidence of previous resection around the right ureteral orifice  There are 3 small papillary lesion all less than 0 5 cm on the posterior wall of the bladder  The lesions appear to be low grade  The remainder of the bladder is unremarkable  Complications:   None    Procedure and Technique:  The patient was brought to the operating room properly identified  General anesthesia was administered  He was then placed in lithotomy position and prepped and draped in usual sterile fashion  Compression boots were applied  Intravenous antibiotic was administered  An appropriate time-out was performed  Cystourethroscopy was performed 22 [place] cystoscope  The 70 and 30 degree lenses were imported  Findings are as above  With identification of the bladder lesions, the cold cup biopsy forceps was employed and excision biopsies of all lesions performed  The biopsy sites and surrounding mucosa were then fulgurated with the Bugbee electrode  Hemostasis appeared adequate  No other lesions were identified  Hemostasis was then reconfirmed  The bladder was drained  The cystoscope removed  The patient tolerated procedure well  He was taken to the recovery room in satisfactory condition     I was present for the entire procedure    Patient Disposition:  PACU     SIGNATURE: Rosalina Chandler MD  DATE: August 16, 2018  TIME: 9:18 AM

## 2018-08-16 NOTE — ANESTHESIA POSTPROCEDURE EVALUATION
Post-Op Assessment Note      CV Status:  Stable    Mental Status:  Alert and awake    Hydration Status:  Euvolemic    PONV Controlled:  Controlled    Airway Patency:  Patent    Post Op Vitals Reviewed: Yes          Staff: Anesthesiologist           /68 (08/16/18 0956)    Temp      Pulse 65 (08/16/18 0956)   Resp 13 (08/16/18 0956)    SpO2 94 % (08/16/18 0956)

## 2018-08-16 NOTE — DISCHARGE INSTR - AVS FIRST PAGE
Phenazopyridine can help with urinary burning  It will cause urine to be orange in color-this can sometimes be confused with blood in the urine  The urine will stain  Use Tylenol or ibuprofen for discomfort  Call for fever, abdominal pain, difficulty voiding or hyper urinary bleeding

## 2018-08-16 NOTE — H&P (VIEW-ONLY)
IPSS Questionnaire (AUA-7): Over the past month    1)  How often have you had a sensation of not emptying your bladder completely after you finish urinating? 0 - Not at all   2)  How often have you had to urinate again less than two hours after you finished urinating? 1 - Less than 1 time in 5   3)  How often have you found you stopped and started again several times when you urinated? 0 - Not at all   4) How difficult have you found it to postpone urination? 0 - Not at all   5) How often have you had a weak urinary stream?  0 - Not at all   6) How often have you had to push or strain to begin urination? 0 - Not at all   7) How many times did you most typically get up to urinate from the time you went to bed until the time you got up in the morning? 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    Operation:  Flexible cystoscopy    Surgeon:  Gerald Issa MD,FACS    Anesthesia:  Xylocaine jelly    Procedure:  Patient was brought to the cystoscopy room and positively identified  The patient was prepped and draped in sterile fashion  Ten mL of 1% xylocaine jelly was instilled into the urethra  A penile clamp was applied  The flexible cystoscope was inserted  Findings are as follows:    Penile Urethra:normal  Bulbar Urethra:normal  Prostate:   Minimally occlusive  Bladder: The bladder neck is normal  Ureteral orifices are in normal  position  The mucosa is   Normal except for 3 papillary tumors near the midline between the bladder neck in interureteric ridge  The largest of these is approximately 5 mm      There is mild trabeculation  The cystoscope was removed  The patient tolerated the procedure well    Following additional consultation to review the findings with the patient, he was discharged from the office in satisfactory condition  Impression:  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  Assessment/Plan:    Malignant neoplasm of trigone of urinary bladder ([address] )   The patient has several small recurrent bladder tumors  He will be scheduled for bladder biopsy and fulguration  Following recovery, we will discuss BCG treatment  Diagnoses and all orders for this visit:    Malignant neoplasm of trigone of urinary bladder (HCC)  -     POCT urine dip auto non-scope    Other specified counseling          Subjective:      Patient ID: Elvin Coughlin is a 52 y o  male  HPI   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:   The patient returns for surveillance cystoscopy today  He has 3 small recurrences  Three months ago, a single tiny recurrence was fulgurated in the office  These lesions are a bit larger in because your more numerous, I think it would be easier on the patient to perform the bladder biopsy and fulguration in the hospital under anesthesia  The patient is recurrences are becoming more frequent and more numerous  Following treatment for the current back, we will discuss a course of BCG  The following portions of the patient's history were reviewed and updated as appropriate: allergies, current medications, past family history, past medical history, past social history, past surgical history and problem list     Review of Systems   Constitutional: Negative for activity change and fatigue  Respiratory: Negative for shortness of breath and wheezing  Cardiovascular: Negative for chest pain  Gastrointestinal: Negative for abdominal pain  Genitourinary: Negative for difficulty urinating, dysuria, frequency, hematuria and urgency  Musculoskeletal: Negative for back pain and gait problem  Skin: Negative  Allergic/Immunologic: Negative  Neurological: Negative  Psychiatric/Behavioral: Negative  Objective:      /86 (BP Location: Left arm, Patient Position: Sitting, Cuff Size: Standard)   Ht 6' (1 829 m)   Wt 92 5 kg (204 lb)   BMI 27 67 kg/m²           Physical Exam   Constitutional: He is oriented to person, place, and time  He appears well-developed and well-nourished  HENT:   Head: Normocephalic and atraumatic  Eyes: EOM are normal    Neck: Normal range of motion  Neck supple  Cardiovascular: Normal rate, regular rhythm and normal heart sounds  Pulmonary/Chest: Effort normal and breath sounds normal    Abdominal: Soft  There is no tenderness  Genitourinary: Penis normal    Genitourinary Comments:   External genitalia normal   Musculoskeletal: Normal range of motion  Neurological: He is alert and oriented to person, place, and time  Skin: Skin is warm and dry  Psychiatric: He has a normal mood and affect   His behavior is normal  Judgment and thought content normal

## 2018-08-16 NOTE — DISCHARGE INSTRUCTIONS

## 2018-08-16 NOTE — INTERVAL H&P NOTE
H&P reviewed  After examining the patient I find no changes in the patients condition since the H&P had been written  I reviewed the procedure- risks and benefits with the patient in the holding unit  Mitomycin will not be instilled as the patient is already scheduled to receive BCG

## 2018-08-21 ENCOUNTER — OFFICE VISIT (OUTPATIENT)
Dept: URGENT CARE | Age: 53
End: 2018-08-21
Payer: COMMERCIAL

## 2018-08-21 VITALS
OXYGEN SATURATION: 97 % | SYSTOLIC BLOOD PRESSURE: 142 MMHG | RESPIRATION RATE: 20 BRPM | HEIGHT: 72 IN | TEMPERATURE: 96 F | WEIGHT: 208.2 LBS | DIASTOLIC BLOOD PRESSURE: 89 MMHG | BODY MASS INDEX: 28.2 KG/M2

## 2018-08-21 DIAGNOSIS — S61.012A LACERATION OF LEFT THUMB WITHOUT FOREIGN BODY WITHOUT DAMAGE TO NAIL, INITIAL ENCOUNTER: Primary | ICD-10-CM

## 2018-08-21 PROCEDURE — 12001 RPR S/N/AX/GEN/TRNK 2.5CM/<: CPT | Performed by: NURSE PRACTITIONER

## 2018-08-21 PROCEDURE — G0382 LEV 3 HOSP TYPE B ED VISIT: HCPCS | Performed by: NURSE PRACTITIONER

## 2018-08-21 RX ORDER — LIDOCAINE HYDROCHLORIDE 10 MG/ML
5 INJECTION, SOLUTION EPIDURAL; INFILTRATION; INTRACAUDAL; PERINEURAL ONCE
Status: COMPLETED | OUTPATIENT
Start: 2018-08-21 | End: 2018-08-21

## 2018-08-21 RX ADMIN — LIDOCAINE HYDROCHLORIDE 5 ML: 10 INJECTION, SOLUTION EPIDURAL; INFILTRATION; INTRACAUDAL; PERINEURAL at 16:43

## 2018-08-21 NOTE — PATIENT INSTRUCTIONS
Keep clean and dry  Avoid soaking in water  Suture removal in 10 days  Continue to monitor for signs or symptoms of infection as discussed  Care For Your Stitches   AMBULATORY CARE:   Stitches , or sutures, are used to close cuts and wounds on the skin  Stitches need to be removed after your wound has healed  Seek care immediately if:   · Your stitches come apart  · Blood soaks through your bandages  · You suddenly cannot move your injured joint  · You have sudden numbness around your wound  · You see red streaks coming from your wound  Contact your healthcare provider if:   · You have a fever and chills  · Your wound is red, warm, swollen, or leaking pus  · There is a bad smell coming from your wound  · You have increased pain in the wound area  · You have questions or concerns about your condition or care  Care for your stitches:  Keep your stitches clean and dry  You may need to cover your stitches with a bandage for 24 to 48 hours, or as directed  Do not bump or hit the suture area, as this could open the wound  Do not trim or shorten the ends of your stitches  If they rub on your clothing, put a gauze bandage between the stitches and your clothes  Clean your wound:  Carefully wash your wound with soap and water  For mouth and lip wounds, rinse your mouth after meals and at bedtime  Ask your healthcare provider what to use to rinse your mouth  If you have a scalp wound, you may gently wash your hair every 2 days with mild shampoo  Do not use hair products, such as hair spray  Help your wound heal:   · Elevate  your wound above the level of your heart as often as you can  This will help decrease swelling and pain  Prop your wound on pillows or blankets to keep it elevated comfortably  · Limit activity  Do not stretch the skin around your wound  This will help prevent bleeding and swelling of the wound area  Follow up with your healthcare provider as directed:   You may need to return to have your stitches removed  Write down your questions so you remember to ask them during your visits  © 2017 [address] [employer] Information is for End User's use only and may not be sold, redistributed or otherwise used for commercial purposes  All illustrations and images included in CareNotes® are the copyrighted property of A D A M [employer]  or Aaron Hanley  The above information is an  only  It is not intended as medical advice for individual conditions or treatments  Talk to your doctor, nurse or pharmacist before following any medical regimen to see if it is safe and effective for you  Finger Laceration   AMBULATORY CARE:   A finger laceration  is a deep cut in your skin  It is often caused by a sharp object, such as a knife, or blunt force to your finger  Your blood vessels, bones, joints, tendons, or nerves may also be injured  Signs and symptoms: Your symptoms may depend on whether nerves, tendons, or deeper tissues were injured  You may have any of the following:  · A cut, tear, or gash in your finger    · Bleeding, swelling, or pain    · Numbness or tingling in your finger    · Trouble moving your finger  Seek care immediately if:   · Your wound comes apart  · Blood soaks through your bandage  · You have severe pain in your finger or hand  · Your finger is pale and cold  · You have sudden trouble moving your finger  · Your swelling suddenly gets worse  · You have red streaks on your skin coming from your wound  Contact your healthcare provider or hand specialist if:   · You have new numbness or tingling  · Your finger feels warm, looks swollen or red, and is draining pus  · You have a fever  · You have questions or concerns about your condition or care  Treatment for a finger laceration  will depend on how large and deep the laceration is  It also depends on whether you have damage to deeper tissues   You may need any of the following:  · Pressure  may be applied to stop any bleeding  · Wound cleaning  may be needed to remove dirt or debris  This will decrease the risk of infection  Your healthcare provider may need to look in your laceration for foreign objects or damage to deeper tissues  Before your laceration is cleaned and checked, you may be given medicine to numb the area  You may also be given medicine to help you relax  · Wound closure  with stitches, medical glue, or Steri-Strips may be needed  These help the wound close and heal  A splint may be placed over your stitches, glue, or Steri-Strips  This will help decrease stress on the wound and prevent it from coming apart  · Medicine  may be given to treat pain or decrease your risk for infection  You may also be given a tetanus shot  Your healthcare provider will decide if you need a tetanus shot  Wounds at high risk for tetanus infection include wounds with dirt or saliva in them  Tell your healthcare provider if you have had the tetanus vaccine or a booster within the last 5 years  · Surgery  may be needed to clean your wound and remove foreign objects  Surgery may also be needed to repair injuries to tendons, nerves, or bones  Self-care:   · Apply ice  on your finger for 15 to 20 minutes every hour or as directed  Use an ice pack, or put crushed ice in a plastic bag  Cover it with a towel before you apply it to your skin  Ice helps prevent tissue damage and decreases swelling and pain  · Elevate  your hand above the level of your heart as often as you can  This will help decrease swelling and pain  Prop your hand on pillows or blankets to keep it elevated comfortably  · Wear your splint as directed  A splint will decrease movement and stress on your wound  The splint may help your wound heal faster  Ask your healthcare provider how to apply and remove a splint  · Apply ointments to decrease scarring    Do not apply ointments until your healthcare provider says it is okay  You may need to wait until your wound is healed  Ask which ointment to buy and how often to use it  Wound care:   · Do not get your wound wet until your healthcare provider says it is okay  Do not soak your hand in water  Do not go swimming until your healthcare provider says it is okay  When your healthcare provider says it is okay, carefully wash around the wound with soap and water  Let soap and water run over your wound  Gently pat the area dry or allow it to air dry  · Change your bandages when they get wet, dirty, or after washing  Apply new, clean bandages as directed  Do not apply elastic bandages or tape too tightly  Do not put powders or lotions on your wound  · Apply antibiotic ointment as directed  Your healthcare provider may give you antibiotic ointment to put over your wound if you have stitches  If you have Strips-Strips over your wound, let them dry up and fall off on their own  If they do not fall off within 14 days, gently remove them  If you have glue over your wound, do not remove or pick at it  If your glue comes off, do not replace it with glue that you have at home  · Check your wound every day for signs of infection  Signs of infection include swelling, redness, or pus  Follow up with your healthcare provider or hand specialist in 2 days:  Write down your questions so you remember to ask them during your visits  © 2017 [address] [employer] Information is for End User's use only and may not be sold, redistributed or otherwise used for commercial purposes  All illustrations and images included in CareNotes® are the copyrighted property of [employer]  or Aaron Hanley  The above information is an  only  It is not intended as medical advice for individual conditions or treatments   Talk to your doctor, nurse or pharmacist before following any medical regimen to see if it is safe and effective for you

## 2018-08-21 NOTE — PROGRESS NOTES
[facility]        NAME: Jimena Hogan is a 52 y o  male  : 1965    MRN: [id number]  DATE: 2018  TIME: 4:31 PM    Assessment and Plan   Laceration of left thumb without foreign body without damage to nail, initial encounter [S61 012A]  1  Laceration of left thumb without foreign body without damage to nail, initial encounter  TDAP Vaccine greater than or equal to 6yo    lidocaine (PF) (XYLOCAINE-MPF) 1 % injection 5 mL         Patient Instructions       Follow up with PCP in 3-5 days  Proceed to  ER if symptoms worsen  Chief Complaint     Chief Complaint   Patient presents with    Finger Injury     Pt states he was cutting roof shingle approximately one hour ago and the knife slipped off the shingle and cut his left thumb  He cleaned the thumb with peroxide and applied a bandaid  Does not recall last Tdap vaccine  History of Present Illness       57-year-old male presenting today with c/o left thumb laceration sustained approximately one hour ago  He states he was cutting shingles and the knife slipped and caught his thumb  Immediate bleeding  No n/t  Unsure of last tetanus vaccination  Review of Systems   Review of Systems   Constitutional: Negative  Respiratory: Negative  Cardiovascular: Negative  Skin: Positive for wound (left thumb laceration)           Current Medications       Current Outpatient Prescriptions:     Lactobacillus (ACIDOPHILUS PO), Take 1 tablet by mouth every other day  , Disp: , Rfl:     Current Facility-Administered Medications:     lidocaine (PF) (XYLOCAINE-MPF) 1 % injection 5 mL, 5 mL, Infiltration, Once, ARTURO Puckett    Current Allergies     Allergies as of 2018    (No Known Allergies)            The following portions of the patient's history were reviewed and updated as appropriate: allergies, current medications, past family history, past medical history, past social history, past surgical history and problem list      Past Medical History:   Diagnosis Date    Asthma     in the past exercise induced/no longer on inhalers    BPH without obstruction/lower urinary tract symptoms     Chronic back pain     Colloid cyst of brain ([address] ) 05/12/2015    "third ventricle"    GERD (gastroesophageal reflux disease)     Gross hematuria 01/2015    pt reports not recently    Malignant neoplasm of trigone of urinary bladder (HCC)     Numbness and tingling of foot     Right foot,,pt reports today not currently    Wears glasses        Past Surgical History:   Procedure Laterality Date    BLADDER FULGURATION  04/07/2017    BRAIN SURGERY  05/15/2015    CYST REMOVAL  05/2015    CYSTOSCOPY  2015    AR CYSTOURETHROSCOPY,FULGUR <0 5 CM LESN N/A 8/16/2018    Procedure: CYSTOSCOPY , BLADDER BIOPSIES WITH FULGURATION;  Surgeon: Vamsi Meraz MD;  Location: [facility];  Service: Urology    TRANSURETHRAL RESECTION OF BLADDER TUMOR  2015    2-5 cm    VASECTOMY  2002    WISDOM TOOTH EXTRACTION         Family History   Problem Relation Age of Onset    Stroke Father          Medications have been verified  Objective   /89   Temp (!) 96 °F (35 6 °C)   Resp 20   Ht 6' (1 829 m)   Wt 94 4 kg (208 lb 3 2 oz)   SpO2 97%   BMI 28 24 kg/m²        Physical Exam     Physical Exam   Constitutional: He is oriented to person, place, and time  He appears well-developed and well-nourished  Cardiovascular: Normal rate, regular rhythm and normal heart sounds  Pulmonary/Chest: Effort normal and breath sounds normal    Musculoskeletal: Normal range of motion  He exhibits tenderness (left thumb)  Neurological: He is alert and oriented to person, place, and time  Skin: Skin is warm and dry  Nursing note and vitals reviewed  Laceration repair  Date/Time: 8/21/2018 5:03 PM  Performed by: Joes R Michaud  Authorized by: Jose R Michaud   Consent: Verbal consent obtained    Risks and benefits: risks, benefits and alternatives were discussed  Consent given by: patient  Patient understanding: patient states understanding of the procedure being performed  Patient consent: the patient's understanding of the procedure matches consent given  Body area: upper extremity  Location details: left thumb  Laceration length: 2 5 cm  Foreign bodies: no foreign bodies  Tendon involvement: none  Nerve involvement: none  Vascular damage: no  Anesthesia: local infiltration    Anesthesia:  Local Anesthetic: lidocaine 1% without epinephrine  Anesthetic total: 2 mL      Procedure Details:  Preparation: Patient was prepped and draped in the usual sterile fashion    Irrigation solution: saline  Irrigation method: syringe  Amount of cleaning: standard  Debridement: none  Degree of undermining: none  Skin closure: 4-0 nylon  Number of sutures: 2  Technique: simple  Approximation: loose  Approximation difficulty: simple  Dressing: 4x4 sterile gauze  Patient tolerance: Patient tolerated the procedure well with no immediate complications

## 2018-08-29 ENCOUNTER — OFFICE VISIT (OUTPATIENT)
Dept: UROLOGY | Facility: MEDICAL CENTER | Age: 53
End: 2018-08-29
Payer: COMMERCIAL

## 2018-08-29 VITALS
WEIGHT: 206 LBS | HEIGHT: 72 IN | DIASTOLIC BLOOD PRESSURE: 78 MMHG | SYSTOLIC BLOOD PRESSURE: 130 MMHG | BODY MASS INDEX: 27.9 KG/M2

## 2018-08-29 DIAGNOSIS — C67.0 MALIGNANT NEOPLASM OF TRIGONE OF URINARY BLADDER (HCC): Primary | ICD-10-CM

## 2018-08-29 LAB
SL AMB  POCT GLUCOSE, UA: ABNORMAL
SL AMB LEUKOCYTE ESTERASE,UA: ABNORMAL
SL AMB POCT BILIRUBIN,UA: ABNORMAL
SL AMB POCT BLOOD,UA: ABNORMAL
SL AMB POCT CLARITY,UA: CLEAR
SL AMB POCT COLOR,UA: YELLOW
SL AMB POCT KETONES,UA: ABNORMAL
SL AMB POCT NITRITE,UA: ABNORMAL
SL AMB POCT PH,UA: 5.5
SL AMB POCT SPECIFIC GRAVITY,UA: >=1.03
SL AMB POCT URINE PROTEIN: ABNORMAL
SL AMB POCT UROBILINOGEN: 0.2

## 2018-08-29 PROCEDURE — 81003 URINALYSIS AUTO W/O SCOPE: CPT | Performed by: UROLOGY

## 2018-08-29 PROCEDURE — 99214 OFFICE O/P EST MOD 30 MIN: CPT | Performed by: UROLOGY

## 2018-08-29 NOTE — PROGRESS NOTES
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  Even though these are low-grade he has exhibited frequent recurrences that are multifocal   We had a long discussion today regarding the pros and cons of BCG therapy  The risks of BCG were discussed in detail as per the consent form  At the conclusion were discussion the patient did wish to proceed with BCG therapy  We will plan a 6 weeks induction course  He will then return for cystoscopy  Diagnoses and all orders for this visit:    Malignant neoplasm of trigone of urinary bladder (HCC)  -     POCT urine dip auto non-scope          Subjective:      Patient ID: Jimena Hogan is a 52 y o  male  Chief complaint:  Bladder cancer    Pepe Morataya returns to the office for follow-up  He is now almost 2 weeks post cystoscopy with biopsy and fulguration of small bladder tumors  The procedure was well tolerated  There were no intraoperative complications  At the present time he notes he is voiding well  His stream is good and his urine is clear  There is no gross hematuria, dysuria or symptoms of infection at this time  He has no flank pain  The following portions of the patient's history were reviewed and updated as appropriate: allergies, current medications, past family history, past medical history, past social history, past surgical history and problem list     Review of Systems   Constitutional: Negative for chills, diaphoresis, fatigue and fever  HENT: Negative  Eyes: Negative  Respiratory: Negative  Cardiovascular: Negative  Endocrine: Negative  Musculoskeletal: Negative  Skin: Negative  Allergic/Immunologic: Negative  Neurological: Negative  Hematological: Negative  Psychiatric/Behavioral: Negative            Objective:      /78 (BP Location: Left arm, Patient Position: Sitting)   Ht 6' (1 829 m)   Wt 93 4 kg (206 lb)   BMI 27 94 kg/m²          Physical Exam   Constitutional: He is oriented to person, place, and time  He appears well-developed and well-nourished  HENT:   Head: Normocephalic and atraumatic  Eyes: Conjunctivae are normal    Neck: Neck supple  Cardiovascular: Normal rate  Pulmonary/Chest: Effort normal    Abdominal: He exhibits no distension and no mass  There is no tenderness  There is no rebound and no guarding  No hernia   Neurological: He is alert and oriented to person, place, and time  Skin: Skin is warm and dry  Psychiatric: He has a normal mood and affect   His behavior is normal  Judgment and thought content normal

## 2018-08-29 NOTE — ASSESSMENT & PLAN NOTE
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

## 2018-08-29 NOTE — PATIENT INSTRUCTIONS
Bacillus of [address] (Inside the bladder)   Bacillus of Calmette and Traci (ba-SILL-us of Nadir-met and LEXIE-in)  Treats or prevents bladder cancer, and used to prevent certain bladder tumors  Also called Bacillus Calmette-Traci (or BCG)  Brand Name(s): Prerna Long BCG   There may be other brand names for this medicine  When This Medicine Should Not Be Used: You should not use this medicine if your immune system is weak due to other medicines or illness  You should not use this medicine if you have a fever (unless your doctor knows the cause) or if you have a bladder infection  You may not be able to receive this medicine if you have active tuberculosis, or blood in your urine  How to Use This Medicine:   Liquid  · Do not drink fluids for 4 hours before your treatment  Empty your bladder before you receive this medicine  · This medicine is given by running the liquid through a tube (catheter) into your bladder  The tube is then taken out, and you will hold the medicine in your bladder for two hours  · While this medicine is in the bladder, you should lie on your back, stomach, and each side for 15 minutes in each position  · You can get up after the first hour, but hold the medicine in your bladder for 1 more hour  If you are unable to hold it, tell your doctor or nurse  · Treatment with BCG may be given once a week for 6 weeks and approximately each month afterward for 6 to 12 months, or as your doctor orders  · Drink plenty of water during each treatment  · After each treatment, all patients (men and women) should sit down to urinate  · Any time you urinate in the first 6 hours after treatment, disinfect the urine by adding an equal amount (usually about 8 ounces, or 1 cup) of household bleach to the toilet bowl and letting it stand for 15 minutes before flushing  · A nurse or other health provider will give you this medicine    Drugs and Foods to Avoid:   Ask your doctor or pharmacist before using any other medicine, including over-the-counter medicines, vitamins, and herbal products  · Make sure your doctor knows if you are also using a medicine to treat an infection or tuberculosis  Some tuberculosis medicines are ethambutol, isoniazid, and rifampin  Medicines to treat an infection (antibiotics) include amoxicillin, azithromycin, penicillin, Augmentin®, Bactrim®, and Cipro®  · Tell your doctor if you are using any medicine that weakens your immune system, such as steroids or cancer treatments  Some steroids are dexamethasone, prednisolone, prednisone, and Medrol®  Warnings While Using This Medicine:   · Before you begin your treatment, make sure your doctor knows if you are pregnant or breast feeding  Do not get pregnant while you are being treated with this medicine  · Tell your doctor if you have any changes in urinary habits after treatment with this medicine  Changes include urinating more or less than usual or having trouble urinating  · Some people develop infections after receiving this medicine  Call your doctor right away if you start to have flu-like symptoms  These symptoms include chills, weakness, and fever (103 degrees or higher) that lasts longer than 72 hours  · Your doctor will do lab tests at regular visits to check on the effects of this medicine  Keep all appointments  Possible Side Effects While Using This Medicine:   Call your doctor right away if you notice any of these side effects:  · Blood in your urine  · Burning or pain when you urinate  · Fever (103 degrees F or higher), chills, or extreme tiredness  · Severe skin rash  · Uncontrollable shaking or trembling  · Urinating more than usual, or feeling like you have to urinate more often  If you notice these less serious side effects, talk with your doctor:   · Cough  · Mild skin rash  · Nausea or vomiting  · Pain in your joints    If you notice other side effects that you think are caused by this medicine, tell your doctor  Call your doctor for medical advice about side effects  You may report side effects to FDA at 7-486-FDA-0800  © 2017 [address] [employer] [address] Information is for End User's use only and may not be sold, redistributed or otherwise used for commercial purposes  The above information is an  only  It is not intended as medical advice for individual conditions or treatments  Talk to your doctor, nurse or pharmacist before following any medical regimen to see if it is safe and effective for you

## 2018-08-29 NOTE — LETTER
August 29, 2018     Terrell Contreras MD  [facility] [id number]    Patient: Dinora Lim   YOB: 1965   Date of Visit: 8/29/2018       Dear Dr Young Munoz: Thank you for referring Dinora Lim to me for evaluation  Below are my notes for this consultation  If you have questions, please do not hesitate to call me  I look forward to following your patient along with you  Sincerely,        Marvis Bence, MD        CC: No Recipients  Marvis Bence, MD  8/29/2018  3:42 PM  Sign at close encounter  Assessment/Plan:    Malignant neoplasm of trigone of urinary bladder [facility])  Pathology reveals the presence of low-grade papillary noninvasive transitional cell carcinoma  Even though these are low-grade he has exhibited frequent recurrences that are multifocal   We had a long discussion today regarding the pros and cons of BCG therapy  The risks of BCG were discussed in detail as per the consent form  At the conclusion were discussion the patient did wish to proceed with BCG therapy  We will plan a 6 weeks induction course  He will then return for cystoscopy  Diagnoses and all orders for this visit:    Malignant neoplasm of trigone of urinary bladder (HCC)  -     POCT urine dip auto non-scope          Subjective:      Patient ID: Dinora Lim is a 52 y o  male  Chief complaint:  Bladder cancer    Allie Uribe returns to the office for follow-up  He is now almost 2 weeks post cystoscopy with biopsy and fulguration of small bladder tumors  The procedure was well tolerated  There were no intraoperative complications  At the present time he notes he is voiding well  His stream is good and his urine is clear  There is no gross hematuria, dysuria or symptoms of infection at this time  He has no flank pain          The following portions of the patient's history were reviewed and updated as appropriate: allergies, current medications, past family history, past medical history, past social history, past surgical history and problem list     Review of Systems   Constitutional: Negative for chills, diaphoresis, fatigue and fever  HENT: Negative  Eyes: Negative  Respiratory: Negative  Cardiovascular: Negative  Endocrine: Negative  Musculoskeletal: Negative  Skin: Negative  Allergic/Immunologic: Negative  Neurological: Negative  Hematological: Negative  Psychiatric/Behavioral: Negative  Objective:      /78 (BP Location: Left arm, Patient Position: Sitting)   Ht 6' (1 829 m)   Wt 93 4 kg (206 lb)   BMI 27 94 kg/m²           Physical Exam   Constitutional: He is oriented to person, place, and time  He appears well-developed and well-nourished  HENT:   Head: Normocephalic and atraumatic  Eyes: Conjunctivae are normal    Neck: Neck supple  Cardiovascular: Normal rate  Pulmonary/Chest: Effort normal    Abdominal: He exhibits no distension and no mass  There is no tenderness  There is no rebound and no guarding  No hernia   Neurological: He is alert and oriented to person, place, and time  Skin: Skin is warm and dry  Psychiatric: He has a normal mood and affect   His behavior is normal  Judgment and thought content normal

## 2018-08-30 ENCOUNTER — TELEPHONE (OUTPATIENT)
Dept: UROLOGY | Facility: MEDICAL CENTER | Age: 53
End: 2018-08-30

## 2018-08-30 NOTE — TELEPHONE ENCOUNTER
LMOM for pt to return call  BCG sched x's 6 treatments beginning 9/17  Please refer to appt screen  To review with pt

## 2018-09-07 ENCOUNTER — TELEPHONE (OUTPATIENT)
Dept: UROLOGY | Facility: MEDICAL CENTER | Age: 53
End: 2018-09-07

## 2018-09-07 DIAGNOSIS — N30.90 CYSTITIS: Primary | ICD-10-CM

## 2018-09-07 NOTE — TELEPHONE ENCOUNTER
Left message for pt Urine Culture to be done prior to 9/12 for BCG instillations begining 9/17  Order placed in [facility]  Requested pt go to [address]  [facility]

## 2018-09-17 NOTE — TELEPHONE ENCOUNTER
Pt did not get Urine Culture completed, has BCG treatment starting today 9/17, please advise    [phone]

## 2018-09-19 ENCOUNTER — APPOINTMENT (OUTPATIENT)
Dept: LAB | Facility: CLINIC | Age: 53
End: 2018-09-19
Payer: COMMERCIAL

## 2018-09-19 DIAGNOSIS — N30.90 CYSTITIS: ICD-10-CM

## 2018-09-19 PROCEDURE — 87086 URINE CULTURE/COLONY COUNT: CPT

## 2018-09-20 LAB — BACTERIA UR CULT: NORMAL

## 2018-09-24 ENCOUNTER — CLINICAL SUPPORT (OUTPATIENT)
Dept: UROLOGY | Facility: MEDICAL CENTER | Age: 53
End: 2018-09-24
Payer: COMMERCIAL

## 2018-09-24 VITALS
WEIGHT: 200 LBS | TEMPERATURE: 98.2 F | SYSTOLIC BLOOD PRESSURE: 144 MMHG | HEIGHT: 72 IN | BODY MASS INDEX: 27.09 KG/M2 | DIASTOLIC BLOOD PRESSURE: 92 MMHG

## 2018-09-24 DIAGNOSIS — C67.9 MALIGNANT NEOPLASM OF URINARY BLADDER, UNSPECIFIED SITE (HCC): Primary | ICD-10-CM

## 2018-09-24 DIAGNOSIS — C67.0 MALIGNANT NEOPLASM OF TRIGONE OF URINARY BLADDER (HCC): ICD-10-CM

## 2018-09-24 LAB
SL AMB  POCT GLUCOSE, UA: NEGATIVE
SL AMB LEUKOCYTE ESTERASE,UA: NEGATIVE
SL AMB POCT BILIRUBIN,UA: NEGATIVE
SL AMB POCT BLOOD,UA: NEGATIVE
SL AMB POCT CLARITY,UA: CLEAR
SL AMB POCT COLOR,UA: NORMAL
SL AMB POCT KETONES,UA: NEGATIVE
SL AMB POCT NITRITE,UA: NEGATIVE
SL AMB POCT PH,UA: 6
SL AMB POCT SPECIFIC GRAVITY,UA: 1.01
SL AMB POCT URINE PROTEIN: NEGATIVE
SL AMB POCT UROBILINOGEN: NORMAL

## 2018-09-24 PROCEDURE — 81003 URINALYSIS AUTO W/O SCOPE: CPT

## 2018-09-24 PROCEDURE — 99211 OFF/OP EST MAY X REQ PHY/QHP: CPT

## 2018-09-24 NOTE — PROGRESS NOTES
I have reviewed the notes, assessments, and/or procedures performed , I concur with her/his documentation of Francesco Peck

## 2018-09-24 NOTE — PROGRESS NOTES
Bladder instillation     Date/Time 9/24/2018 12:58 PM     Performed by  ENEDINA NASCIMENTO     Authorized by Yonatan Garcia       Consent: Verbal consent obtained  Consent given by: patient  Patient understanding: patient states understanding of the procedure being performed  Patient consent: the patient's understanding of the procedure matches consent given  Procedure consent: procedure consent matches procedure scheduled  Relevant documents: relevant documents present and verified  Patient identity confirmed: verbally with patient      Preparation: Patient was prepped and draped in the usual sterile fashion  Site preparation: Betadine    Local anesthesia used: yes     Anesthesia   Local anesthesia used: yes  Local Anesthetic: topical anesthetic     Procedure Details   Procedure Notes: Risks, benefits and some of the potential complications of the procedure were discussed at length with the patient including infection, bleeding, voiding discomfort, urinary retention, fever, chills, sepsis and others  All questions were answered  Informed consent was obtained  Sterile technique and intraurethral analgesia were used  A 14   Namibian red rubber coude' tipcatheter was gently placed through the urethra and into the patient's bladder  The bladder was emptied of all urine  Approximately 50 cc's of BCG was instilled into the bladder via syringe push  The catheter was removed  The patient was instructed not to empty their bladder for two hours  The procedure was tolerated well without complications The patient was instructed to call the office for bloody urine, difficulty urinating, painful urination, fever, chills, nausea, vomiting  The patient stated they understood these instructions and would comply

## 2018-10-01 ENCOUNTER — CLINICAL SUPPORT (OUTPATIENT)
Dept: UROLOGY | Facility: MEDICAL CENTER | Age: 53
End: 2018-10-01
Payer: COMMERCIAL

## 2018-10-01 VITALS
HEIGHT: 72 IN | BODY MASS INDEX: 28.17 KG/M2 | WEIGHT: 208 LBS | SYSTOLIC BLOOD PRESSURE: 160 MMHG | DIASTOLIC BLOOD PRESSURE: 98 MMHG

## 2018-10-01 DIAGNOSIS — C67.0 MALIGNANT NEOPLASM OF TRIGONE OF URINARY BLADDER (HCC): Primary | ICD-10-CM

## 2018-10-01 PROCEDURE — 99211 OFF/OP EST MAY X REQ PHY/QHP: CPT

## 2018-10-01 NOTE — PROGRESS NOTES
Bladder instillation     Date/Time 10/1/2018 2:23 PM     Performed by  Romana Antonio by Juan M Ruiz       Consent: Written consent obtained  Consent given by: patient  Patient understanding: patient states understanding of the procedure being performed  Patient identity confirmed: verbally with patient      Site preparation: Betadine    Local anesthesia used: yes     Anesthesia   Local anesthesia used: yes  Local Anesthetic: topical anesthetic  Anesthetic total: 5 mL     Procedure Details   Procedure Notes: Risks, benefits and some of the potential complications of the procedure were discussed at length with the patient including infection, bleeding, voiding discomfort, urinary retention, fever, chills, sepsis and others  All questions were answered  Informed consent was obtained  Sterile technique and intraurethral analgesia were used  A 14  Maltese red rubber coude tip catheter was gently placed through the urethra and into the patient's bladder  The bladder was emptied of all urine  Approximately 50 cc's of BCG was instilled into the bladder via catheter syringe by gravity  The catheter was removed  The patient was instructed not to empty his bladder for two hours  The procedure was tolerated well without complications The patient was instructed to call the office for bloody urine, difficulty urinating, painful urination, fever, chills, nausea, vomiting  The patient stated he understood these instructions and would comply

## 2018-10-08 ENCOUNTER — CLINICAL SUPPORT (OUTPATIENT)
Dept: UROLOGY | Facility: MEDICAL CENTER | Age: 53
End: 2018-10-08
Payer: COMMERCIAL

## 2018-10-08 VITALS
BODY MASS INDEX: 28.58 KG/M2 | DIASTOLIC BLOOD PRESSURE: 80 MMHG | HEIGHT: 72 IN | SYSTOLIC BLOOD PRESSURE: 138 MMHG | WEIGHT: 211 LBS

## 2018-10-08 DIAGNOSIS — C67.0 MALIGNANT NEOPLASM OF TRIGONE OF URINARY BLADDER (HCC): Primary | ICD-10-CM

## 2018-10-08 LAB
SL AMB  POCT GLUCOSE, UA: NEGATIVE
SL AMB LEUKOCYTE ESTERASE,UA: NEGATIVE
SL AMB POCT BILIRUBIN,UA: NEGATIVE
SL AMB POCT BLOOD,UA: NEGATIVE
SL AMB POCT CLARITY,UA: CLEAR
SL AMB POCT COLOR,UA: YELLOW
SL AMB POCT KETONES,UA: NORMAL
SL AMB POCT NITRITE,UA: NEGATIVE
SL AMB POCT PH,UA: 6
SL AMB POCT SPECIFIC GRAVITY,UA: 1.02
SL AMB POCT URINE PROTEIN: NEGATIVE
SL AMB POCT UROBILINOGEN: 0.2

## 2018-10-08 PROCEDURE — 81003 URINALYSIS AUTO W/O SCOPE: CPT

## 2018-10-08 PROCEDURE — 99211 OFF/OP EST MAY X REQ PHY/QHP: CPT

## 2018-10-08 NOTE — PROGRESS NOTES
Bladder instillation     Date/Time 10/8/2018 1:01 PM     Performed by  Alize Holland by Kei George       Consent: Verbal consent obtained  Consent given by: patient  Patient understanding: patient states understanding of the procedure being performed  Patient identity confirmed: verbally with patient      Preparation: Patient was prepped and draped in the usual sterile fashion  Site preparation: Betadine    Local anesthesia used: yes     Anesthesia   Local anesthesia used: yes  Local Anesthetic: topical anesthetic  Anesthetic total: 5 mL     Procedure Details   Procedure Notes: Risks, benefits and some of the potential complications of the procedure were discussed at length with the patient including infection, bleeding, voiding discomfort, urinary retention, fever, chills, sepsis and others  All questions were answered  Pt denies any side effects from last instillation  Sterile technique and intraurethral analgesia were used  A 14  Chinese coude red rubber catheter was gently placed through the urethra and into the patient's bladder  The bladder was emptied of all urine  Approximately 50 cc's of BCG was instilled into the bladder via fluid transfer tube with catheter adapter  The catheter was gently removed  The patient was instructed not to empty his bladder for two hours  The procedure was tolerated well without complications The patient was instructed to call the office for bloody urine, difficulty urinating, painful urination, fever, chills, nausea, vomiting  The patient stated they understood these instructions and would comply

## 2018-10-15 ENCOUNTER — CLINICAL SUPPORT (OUTPATIENT)
Dept: UROLOGY | Facility: MEDICAL CENTER | Age: 53
End: 2018-10-15
Payer: COMMERCIAL

## 2018-10-15 VITALS
HEIGHT: 72 IN | SYSTOLIC BLOOD PRESSURE: 150 MMHG | DIASTOLIC BLOOD PRESSURE: 88 MMHG | WEIGHT: 211 LBS | BODY MASS INDEX: 28.58 KG/M2

## 2018-10-15 DIAGNOSIS — C67.0 MALIGNANT NEOPLASM OF TRIGONE OF URINARY BLADDER (HCC): Primary | ICD-10-CM

## 2018-10-15 LAB
SL AMB  POCT GLUCOSE, UA: NEGATIVE
SL AMB LEUKOCYTE ESTERASE,UA: NEGATIVE
SL AMB POCT BILIRUBIN,UA: NEGATIVE
SL AMB POCT BLOOD,UA: NEGATIVE
SL AMB POCT CLARITY,UA: CLEAR
SL AMB POCT COLOR,UA: YELLOW
SL AMB POCT KETONES,UA: NEGATIVE
SL AMB POCT NITRITE,UA: NEGATIVE
SL AMB POCT PH,UA: 7.5
SL AMB POCT SPECIFIC GRAVITY,UA: 1.02
SL AMB POCT URINE PROTEIN: NEGATIVE
SL AMB POCT UROBILINOGEN: 0.2

## 2018-10-15 PROCEDURE — 99211 OFF/OP EST MAY X REQ PHY/QHP: CPT

## 2018-10-15 PROCEDURE — 81003 URINALYSIS AUTO W/O SCOPE: CPT

## 2018-10-15 NOTE — PROGRESS NOTES
I have reviewed the notes, assessments, and/or procedures performed , I concur with her/his documentation of Amalia Lay

## 2018-10-18 ENCOUNTER — APPOINTMENT (OUTPATIENT)
Dept: LAB | Facility: CLINIC | Age: 53
End: 2018-10-18
Payer: COMMERCIAL

## 2018-10-18 ENCOUNTER — TRANSCRIBE ORDERS (OUTPATIENT)
Dept: LAB | Facility: CLINIC | Age: 53
End: 2018-10-18

## 2018-10-18 DIAGNOSIS — C67.0 MALIGNANT NEOPLASM OF TRIGONE OF URINARY BLADDER (HCC): ICD-10-CM

## 2018-10-18 PROCEDURE — 87086 URINE CULTURE/COLONY COUNT: CPT

## 2018-10-19 LAB — BACTERIA UR CULT: NORMAL

## 2018-10-22 ENCOUNTER — CLINICAL SUPPORT (OUTPATIENT)
Dept: UROLOGY | Facility: MEDICAL CENTER | Age: 53
End: 2018-10-22
Payer: COMMERCIAL

## 2018-10-22 VITALS
WEIGHT: 210 LBS | SYSTOLIC BLOOD PRESSURE: 160 MMHG | BODY MASS INDEX: 28.44 KG/M2 | HEIGHT: 72 IN | TEMPERATURE: 98.4 F | DIASTOLIC BLOOD PRESSURE: 100 MMHG

## 2018-10-22 DIAGNOSIS — C67.9 MALIGNANT NEOPLASM OF URINARY BLADDER, UNSPECIFIED SITE (HCC): Primary | ICD-10-CM

## 2018-10-22 LAB
SL AMB  POCT GLUCOSE, UA: NEGATIVE
SL AMB LEUKOCYTE ESTERASE,UA: NEGATIVE
SL AMB POCT BILIRUBIN,UA: NEGATIVE
SL AMB POCT BLOOD,UA: NEGATIVE
SL AMB POCT CLARITY,UA: CLEAR
SL AMB POCT COLOR,UA: NORMAL
SL AMB POCT KETONES,UA: NEGATIVE
SL AMB POCT NITRITE,UA: NEGATIVE
SL AMB POCT PH,UA: 5
SL AMB POCT SPECIFIC GRAVITY,UA: 1.01
SL AMB POCT URINE PROTEIN: NEGATIVE
SL AMB POCT UROBILINOGEN: NORMAL

## 2018-10-22 PROCEDURE — 99211 OFF/OP EST MAY X REQ PHY/QHP: CPT

## 2018-10-22 PROCEDURE — 81003 URINALYSIS AUTO W/O SCOPE: CPT

## 2018-10-22 NOTE — PROGRESS NOTES
Bladder instillation     Date/Time 10/22/2018 1:34 PM     Performed by  ENEDINA NASCIMENTO     Authorized by Noy Saldaña       Consent: Verbal consent obtained  Consent given by: patient  Patient understanding: patient states understanding of the procedure being performed  Patient consent: the patient's understanding of the procedure matches consent given  Procedure consent: procedure consent matches procedure scheduled  Patient identity confirmed: verbally with patient      Site preparation: Betadine    Local anesthesia used: yes     Anesthesia   Local anesthesia used: yes  Local Anesthetic: lidocaine 1% without epinephrine     Procedure Details   Procedure Notes: BCG #5/6 instilled intravesically using #14 F red rubber coude' tip catheter without difficulty  Pt tolerating instillations well and offers no complaints    Patient tolerance: Patient tolerated the procedure well with no immediate complications

## 2018-10-29 ENCOUNTER — CLINICAL SUPPORT (OUTPATIENT)
Dept: UROLOGY | Facility: MEDICAL CENTER | Age: 53
End: 2018-10-29
Payer: COMMERCIAL

## 2018-10-29 VITALS
BODY MASS INDEX: 28.58 KG/M2 | WEIGHT: 211 LBS | DIASTOLIC BLOOD PRESSURE: 88 MMHG | SYSTOLIC BLOOD PRESSURE: 138 MMHG | HEIGHT: 72 IN

## 2018-10-29 DIAGNOSIS — C67.9 MALIGNANT NEOPLASM OF URINARY BLADDER, UNSPECIFIED SITE (HCC): Primary | ICD-10-CM

## 2018-10-29 DIAGNOSIS — C67.0 MALIGNANT NEOPLASM OF TRIGONE OF URINARY BLADDER (HCC): Primary | ICD-10-CM

## 2018-10-29 LAB
SL AMB  POCT GLUCOSE, UA: NEGATIVE
SL AMB LEUKOCYTE ESTERASE,UA: NEGATIVE
SL AMB POCT BILIRUBIN,UA: NEGATIVE
SL AMB POCT BLOOD,UA: NEGATIVE
SL AMB POCT CLARITY,UA: CLEAR
SL AMB POCT COLOR,UA: YELLOW
SL AMB POCT KETONES,UA: NEGATIVE
SL AMB POCT NITRITE,UA: NEGATIVE
SL AMB POCT PH,UA: 5.5
SL AMB POCT SPECIFIC GRAVITY,UA: 1.02
SL AMB POCT URINE PROTEIN: NEGATIVE
SL AMB POCT UROBILINOGEN: 0.2

## 2018-10-29 PROCEDURE — 99211 OFF/OP EST MAY X REQ PHY/QHP: CPT

## 2018-10-29 PROCEDURE — 81003 URINALYSIS AUTO W/O SCOPE: CPT

## 2018-10-29 NOTE — PROGRESS NOTES
Bladder instillation     Date/Time 10/29/2018 1:25 PM     Performed by  Sonya Larios by Joshua Eason       Consent: Verbal consent obtained  Consent given by: patient  Patient identity confirmed: verbally with patient      Preparation: Patient was prepped and draped in the usual sterile fashion  Site preparation: Betadine    Local anesthesia used: yes     Anesthesia   Local anesthesia used: yes  Local Anesthetic: topical anesthetic  Anesthetic total: 5 mL     Procedure Details   Procedure Notes: Risks, benefits and some of the potential complications of the procedure were discussed at length with the patient including infection, bleeding, voiding discomfort, urinary retention, fever, chills, sepsis and others  All questions were answered  Sterile technique and intraurethral analgesia were used  A 14  Tajik coude red rubber catheter was gently placed through the urethra and into the patient's bladder  The bladder was emptied of all urine  Approximately 50 cc's of BCG was instilled into the bladder via catheter syringe and gravity  The catheter was removed  The patient was instructed not to empty his bladder for two hours  The procedure was tolerated well without complications The patient was instructed to call the office for bloody urine, difficulty urinating, painful urination, fever, chills, nausea, vomiting  The patient stated he understood these instructions and would comply

## 2018-11-26 ENCOUNTER — PROCEDURE VISIT (OUTPATIENT)
Dept: UROLOGY | Facility: MEDICAL CENTER | Age: 53
End: 2018-11-26
Payer: COMMERCIAL

## 2018-11-26 VITALS
BODY MASS INDEX: 28.58 KG/M2 | DIASTOLIC BLOOD PRESSURE: 80 MMHG | HEIGHT: 72 IN | SYSTOLIC BLOOD PRESSURE: 150 MMHG | WEIGHT: 211 LBS | HEART RATE: 105 BPM

## 2018-11-26 DIAGNOSIS — C67.4 MALIGNANT NEOPLASM OF POSTERIOR WALL OF URINARY BLADDER (HCC): Primary | ICD-10-CM

## 2018-11-26 PROBLEM — D49.4 BLADDER TUMOR: Status: RESOLVED | Noted: 2018-07-26 | Resolved: 2018-11-26

## 2018-11-26 LAB
SL AMB  POCT GLUCOSE, UA: NORMAL
SL AMB LEUKOCYTE ESTERASE,UA: NORMAL
SL AMB POCT BILIRUBIN,UA: NORMAL
SL AMB POCT BLOOD,UA: NORMAL
SL AMB POCT CLARITY,UA: CLEAR
SL AMB POCT COLOR,UA: YELLOW
SL AMB POCT KETONES,UA: NORMAL
SL AMB POCT NITRITE,UA: NORMAL
SL AMB POCT PH,UA: 7
SL AMB POCT SPECIFIC GRAVITY,UA: 1.01
SL AMB POCT URINE PROTEIN: NORMAL
SL AMB POCT UROBILINOGEN: 0.2

## 2018-11-26 PROCEDURE — 52000 CYSTOURETHROSCOPY: CPT | Performed by: UROLOGY

## 2018-11-26 PROCEDURE — 81003 URINALYSIS AUTO W/O SCOPE: CPT | Performed by: UROLOGY

## 2018-11-26 NOTE — PROGRESS NOTES
Cystoscopy  Date/Time: 11/26/2018 4:33 PM  Performed by: Carlos Ramirez  Authorized by: Carlos Ramirez     Procedure details: cystoscopy    Patient tolerance: Patient tolerated the procedure well with no immediate complications    Additional Procedure Details: Cystoscopy Procedure Note        Pre-operative Diagnosis:  Bladder cancer follow-up    Post-operative Diagnosis:  No evidence of recurrence      Procedure Details   The risks, benefits, complications, treatment options, and expected outcomes were discussed with the patient  The patient concurred with the proposed plan, giving informed consent  Cystoscopy was performed today under local anesthesia, using sterile technique  The patient was placed in the supine position, prepped and draped in the usual sterile fashion  A 15 Vietnamese flexible cystoscope  was used to inspect both the urethra and bladder  Findings:  Normal urethra, predominantly bilobar prostatic hypertrophy causing outlet obstruction  There is a median bar  Ureteral orifices are unremarkable  Bladder is mildly trabeculated  No papillary recurrence is seen  There are several areas of mild erythema consistent with recent BCG therapy  Specimens:  Urinalysis is negative                         Discussion:  The patient is doing well without evidence of recurrence at this time  He tolerated induction BCG well  As he has low-grade noninvasive disease we will not plan for maintenance BCG at this time  We will plan to repeat a cystoscopy in 3 months time

## 2018-11-26 NOTE — ASSESSMENT & PLAN NOTE
He tolerated BCG therapy very well  No evidence of recurrence is noted on cystoscopy today  We will plan to repeat his cystoscopy in 3 months

## 2018-11-26 NOTE — LETTER
November 26, 2018     Franny Sparks MD  [facility] [address]    Patient: Pedro Rowley   YOB: 1965   Date of Visit: 11/26/2018       Dear Dr Chana Peck: Thank you for referring Pedro Rowley to me for evaluation  Below are my notes for this consultation  If you have questions, please do not hesitate to call me  I look forward to following your patient along with you  Sincerely,        Chris Rider MD        CC: No Recipients  Chris Rider MD  11/26/2018  4:36 PM  Sign at close encounter  Cystoscopy  Date/Time: 11/26/2018 4:33 PM  Performed by: Yvette Silva  Authorized by: Yvette Silva     Procedure details: cystoscopy    Patient tolerance: Patient tolerated the procedure well with no immediate complications    Additional Procedure Details: Cystoscopy Procedure Note        Pre-operative Diagnosis:  Bladder cancer follow-up    Post-operative Diagnosis:  No evidence of recurrence      Procedure Details   The risks, benefits, complications, treatment options, and expected outcomes were discussed with the patient  The patient concurred with the proposed plan, giving informed consent  Cystoscopy was performed today under local anesthesia, using sterile technique  The patient was placed in the supine position, prepped and draped in the usual sterile fashion  A 15 Azeri flexible cystoscope  was used to inspect both the urethra and bladder  Findings:  Normal urethra, predominantly bilobar prostatic hypertrophy causing outlet obstruction  There is a median bar  Ureteral orifices are unremarkable  Bladder is mildly trabeculated  No papillary recurrence is seen  There are several areas of mild erythema consistent with recent BCG therapy  Specimens:  None                          Discussion:  The patient is doing well without evidence of recurrence at this time  He tolerated induction BCG well    As he has low-grade noninvasive disease we will not plan for maintenance BCG at this time  We will plan to repeat a cystoscopy in 3 months time

## 2019-03-04 ENCOUNTER — PROCEDURE VISIT (OUTPATIENT)
Dept: UROLOGY | Facility: MEDICAL CENTER | Age: 54
End: 2019-03-04
Payer: COMMERCIAL

## 2019-03-04 VITALS
WEIGHT: 210 LBS | HEART RATE: 72 BPM | SYSTOLIC BLOOD PRESSURE: 142 MMHG | DIASTOLIC BLOOD PRESSURE: 90 MMHG | HEIGHT: 72 IN | BODY MASS INDEX: 28.44 KG/M2

## 2019-03-04 DIAGNOSIS — N13.8 BPH WITH URINARY OBSTRUCTION: ICD-10-CM

## 2019-03-04 DIAGNOSIS — C67.4 MALIGNANT NEOPLASM OF POSTERIOR WALL OF URINARY BLADDER (HCC): Primary | ICD-10-CM

## 2019-03-04 DIAGNOSIS — N40.1 BPH WITH URINARY OBSTRUCTION: ICD-10-CM

## 2019-03-04 PROCEDURE — 81003 URINALYSIS AUTO W/O SCOPE: CPT | Performed by: UROLOGY

## 2019-03-04 PROCEDURE — 52000 CYSTOURETHROSCOPY: CPT | Performed by: UROLOGY

## 2019-03-04 NOTE — PROGRESS NOTES
Cystoscopy  Date/Time: 3/4/2019 11:28 AM  Performed by: Cielo Ocampo MD  Authorized by: Cielo Ocampo MD     Procedure details: cystoscopy    Patient tolerance: Patient tolerated the procedure well with no immediate complications    Additional Procedure Details: Cystoscopy Procedure Note        Pre-operative Diagnosis:  Interval follow-up a bladder cancer    Post-operative Diagnosis:  No evidence of recurrent bladder cancer      Procedure Details   The risks, benefits, complications, treatment options, and expected outcomes were discussed with the patient  The patient concurred with the proposed plan, giving informed consent  Cystoscopy was performed today under local anesthesia, using sterile technique  The patient was placed in the supine position, prepped and draped in the usual sterile fashion  A 15 Botswanan flexible cystoscope  was used to inspect both the urethra and bladder  Findings:  Normal urethra, predominantly bilobar prostatic hypertrophy causing almost complete outflow obstruction  There is a small median bar  Ureteral orifices are unremarkable  The bladder is mildly trabeculated  Ureteral orifices are normal   There are no stones, tumors or diverticula  On the left side of the bladder is a small cyst approximately 5 mm in size covered by normal appearing mucosa  Specimens:  Urinalysis is negative                          Discussion:  The patient has no evidence of recurrent bladder cancer  His pathology was low-grade noninvasive tumor  We will plan to repeat his cystoscopy in 6 months  We will check a PSA prior to  and a digital rectal examination at his next visit

## 2019-03-04 NOTE — LETTER
March 4, 2019     Leonidas Quach MD  [facility] [address]    Patient: Lanae Libman   YOB: 1965   Date of Visit: 3/4/2019       Dear Dr Pedro Luis De Jesus: Thank you for referring Lanae Libman to me for evaluation  Below are my notes for this consultation  If you have questions, please do not hesitate to call me  I look forward to following your patient along with you  Sincerely,        Ferrel Boxer, MD        CC: No Recipients  Ferrel Boxer, MD  3/4/2019 11:34 AM  Sign at close encounter  Cystoscopy  Date/Time: 3/4/2019 11:28 AM  Performed by: Ferrel Boxer, MD  Authorized by: Ferrel Boxer, MD     Procedure details: cystoscopy    Patient tolerance: Patient tolerated the procedure well with no immediate complications    Additional Procedure Details: Cystoscopy Procedure Note        Pre-operative Diagnosis:  Interval follow-up a bladder cancer    Post-operative Diagnosis:  No evidence of recurrent bladder cancer      Procedure Details   The risks, benefits, complications, treatment options, and expected outcomes were discussed with the patient  The patient concurred with the proposed plan, giving informed consent  Cystoscopy was performed today under local anesthesia, using sterile technique  The patient was placed in the supine position, prepped and draped in the usual sterile fashion  A 15 Macedonian flexible cystoscope  was used to inspect both the urethra and bladder  Findings:  Normal urethra, predominantly bilobar prostatic hypertrophy causing almost complete outflow obstruction  There is a small median bar  Ureteral orifices are unremarkable  The bladder is mildly trabeculated  Ureteral orifices are normal   There are no stones, tumors or diverticula  On the left side of the bladder is a small cyst approximately 5 mm in size covered by normal appearing mucosa             Specimens:  Urinalysis is negative Discussion:  The patient has no evidence of recurrent bladder cancer  His pathology was low-grade noninvasive tumor  We will plan to repeat his cystoscopy in 6 months  We will check a PSA prior to  and a digital rectal examination at his next visit

## 2019-09-16 ENCOUNTER — PROCEDURE VISIT (OUTPATIENT)
Dept: UROLOGY | Facility: MEDICAL CENTER | Age: 54
End: 2019-09-16
Payer: COMMERCIAL

## 2019-09-16 VITALS
HEART RATE: 64 BPM | BODY MASS INDEX: 28.44 KG/M2 | WEIGHT: 210 LBS | DIASTOLIC BLOOD PRESSURE: 78 MMHG | HEIGHT: 72 IN | SYSTOLIC BLOOD PRESSURE: 136 MMHG

## 2019-09-16 DIAGNOSIS — N13.8 BENIGN PROSTATIC HYPERPLASIA WITH URINARY OBSTRUCTION: ICD-10-CM

## 2019-09-16 DIAGNOSIS — C67.0 MALIGNANT NEOPLASM OF TRIGONE OF URINARY BLADDER (HCC): ICD-10-CM

## 2019-09-16 DIAGNOSIS — C67.4 MALIGNANT NEOPLASM OF POSTERIOR WALL OF URINARY BLADDER (HCC): Primary | ICD-10-CM

## 2019-09-16 DIAGNOSIS — N40.1 BENIGN PROSTATIC HYPERPLASIA WITH URINARY OBSTRUCTION: ICD-10-CM

## 2019-09-16 LAB
SL AMB  POCT GLUCOSE, UA: NEGATIVE
SL AMB LEUKOCYTE ESTERASE,UA: NEGATIVE
SL AMB POCT BILIRUBIN,UA: NORMAL
SL AMB POCT BLOOD,UA: NEGATIVE
SL AMB POCT CLARITY,UA: CLEAR
SL AMB POCT COLOR,UA: YELLOW
SL AMB POCT KETONES,UA: NEGATIVE
SL AMB POCT NITRITE,UA: NEGATIVE
SL AMB POCT PH,UA: 7
SL AMB POCT SPECIFIC GRAVITY,UA: 1.01
SL AMB POCT URINE PROTEIN: NEGATIVE
SL AMB POCT UROBILINOGEN: 0.2

## 2019-09-16 PROCEDURE — 88112 CYTOPATH CELL ENHANCE TECH: CPT | Performed by: PATHOLOGY

## 2019-09-16 PROCEDURE — 81003 URINALYSIS AUTO W/O SCOPE: CPT | Performed by: UROLOGY

## 2019-09-16 PROCEDURE — 99214 OFFICE O/P EST MOD 30 MIN: CPT | Performed by: UROLOGY

## 2019-09-16 PROCEDURE — 52000 CYSTOURETHROSCOPY: CPT | Performed by: UROLOGY

## 2019-09-16 RX ORDER — CEFAZOLIN SODIUM 2 G/50ML
2000 SOLUTION INTRAVENOUS ONCE
Status: CANCELLED | OUTPATIENT
Start: 2019-10-17 | End: 2019-09-16

## 2019-09-16 NOTE — PROGRESS NOTES
Assessment/Plan:    Malignant neoplasm of trigone of urinary bladder (HCC)  A small papillary recurrence is noted on the trigone  This appears very low-grade  Options were discussed and we elected to proceed with cystoscopy with biopsy and fulguration of this lesion  The small cystic lesion can also be biopsied and fulgurated  Mitomycin-C will be instilled  The procedure was described  Risks were discussed and consent form signed  We will proceed at the patient's convenience  Benign prostatic hyperplasia with urinary obstruction  The patient is voiding well  Digital rectal examination is benign in nature  Urinalysis is negative  PSA testing will be done in the near future  Diagnoses and all orders for this visit:    Malignant neoplasm of posterior wall of urinary bladder (HCC)  -     POCT urine dip auto non-scope    Malignant neoplasm of trigone of urinary bladder ([facility] [address] )  -     Case request operating room: [facility] [address] ; Standing  -     Case request operating room: CYSTOSCOPY WITH BIOPSIES, INSTILLATION MITOMYCIN    Benign prostatic hyperplasia with urinary obstruction    Other orders  -     Cystoscopy  -     Diet NPO; Sips with meds; Standing  -     Place sequential compression device; Standing  -     ceFAZolin (ANCEF) 2,000 mg in dextrose 5 % 100 mL IVPB          Subjective:      Patient ID: Vanna Hooks is a 62 y o  male  Chief complaint:  Bladder cancer and lower urinary tract symptoms    HPI:  38-year-old male followed for the above complaints  The patient has history of low-grade bladder cancer treated last year with an induction course of BCG  He has not had any recurrences to date  He reports he is voiding well  His stream is good and he empties his bladder adequately  He gets up at most once a night to urinate  He has no urgency or urinary incontinence  There is no gross hematuria, dysuria or symptoms of infection  He has no flank pain  He returns today for cystoscopy and follow-up  He has yet to do his PSA testing  The following portions of the patient's history were reviewed and updated as appropriate: allergies, current medications, past family history, past medical history, past social history, past surgical history and problem list     Review of Systems   Constitutional: Negative for diaphoresis, fatigue and fever  HENT: Negative  Eyes: Negative  Respiratory: Negative  Cardiovascular: Negative  Endocrine: Negative  Genitourinary:        See HPI   Musculoskeletal: Negative  Skin: Negative  Allergic/Immunologic: Negative  Neurological: Negative  Hematological: Negative  Psychiatric/Behavioral: Negative  Objective:      /78 (BP Location: Left arm, Patient Position: Sitting, Cuff Size: Standard)   Pulse 64   Ht 6' (1 829 m)   Wt 95 3 kg (210 lb)   BMI 28 48 kg/m²          Physical Exam   Constitutional: He is oriented to person, place, and time  He appears well-developed and well-nourished  HENT:   Head: Normocephalic and atraumatic  Eyes: Conjunctivae are normal    Neck: Neck supple  Cardiovascular: Normal rate  Pulmonary/Chest: Effort normal    Abdominal: Soft  Bowel sounds are normal  He exhibits no distension and no mass  There is no tenderness  There is no rebound, no guarding and no CVA tenderness  No hernia  Genitourinary: Rectum normal, testes normal and penis normal  Right testis shows no mass  Left testis shows no mass  No phimosis or hypospadias  Genitourinary Comments: Prostate 1+ enlarged and palpably benign  Musculoskeletal: He exhibits no edema  Neurological: He is alert and oriented to person, place, and time  Skin: Skin is warm and dry  Psychiatric: He has a normal mood and affect  His behavior is normal  Judgment and thought content normal    Vitals reviewed            Cystoscopy  Date/Time: 9/16/2019 2:00 PM  Performed by: Luis F Pugh MD  Authorized by: Vamsi Meraz MD     Procedure details: cystoscopy    Patient tolerance: Patient tolerated the procedure well with no immediate complications    Additional Procedure Details: Cystoscopy Procedure Note        Pre-operative Diagnosis:  Bladder cancer    Post-operative Diagnosis:  Small papillary recurrence      Procedure Details   The risks, benefits, complications, treatment options, and expected outcomes were discussed with the patient  The patient concurred with the proposed plan, giving informed consent  Cystoscopy was performed today under local anesthesia, using sterile technique  The patient was placed in the supine position, prepped and draped in the usual sterile fashion  A 15 Citizen of [place] flexible cystoscope  was used to inspect both the urethra and bladder  Findings:  Normal urethra predominantly bilobar prostatic hypertrophy causing complete outflow obstruction  Ureteral orifices are normal   There is a small papillary lesion on the  trigone  A small cystic lesion covered by normal mucosa is noted on the left posterior wall  This is also small  The remainder the bladder is unremarkable  The bladder is mildly trabeculated

## 2019-09-16 NOTE — ASSESSMENT & PLAN NOTE
A small papillary recurrence is noted on the trigone  This appears very low-grade  Options were discussed and we elected to proceed with cystoscopy with biopsy and fulguration of this lesion  The small cystic lesion can also be biopsied and fulgurated  Mitomycin-C will be instilled  The procedure was described  Risks were discussed and consent form signed  We will proceed at the patient's convenience

## 2019-09-16 NOTE — LETTER
September 16, 2019     Kev Moore MD  [facility] [address]    Patient: Vidal Krueger   YOB: 1965   Date of Visit: 9/16/2019       Dear Dr Mee Shepherd: Thank you for referring Vidal Krueger to me for evaluation  Below are my notes for this consultation  If you have questions, please do not hesitate to call me  I look forward to following your patient along with you  Sincerely,        Maxi Holman MD        CC: No Recipients  Maxi Holman MD  9/16/2019  2:15 PM  Sign at close encounter  Assessment/Plan:    Malignant neoplasm of trigone of urinary bladder ([address] )  A small papillary recurrence is noted on the trigone  This appears very low-grade  Options were discussed and we elected to proceed with cystoscopy with biopsy and fulguration of this lesion  The small cystic lesion can also be biopsied and fulgurated  Mitomycin-C will be instilled  The procedure was described  Risks were discussed and consent form signed  We will proceed at the patient's convenience  Benign prostatic hyperplasia with urinary obstruction  The patient is voiding well  Digital rectal examination is benign in nature  Urinalysis is negative  PSA testing will be done in the near future         Diagnoses and all orders for this visit:    Malignant neoplasm of posterior wall of urinary bladder (HCC)  -     POCT urine dip auto non-scope    Malignant neoplasm of trigone of urinary bladder ([address] )  -     Case request operating room: [facility] ; Standing  -     Case request operating room: CYSTOSCOPY WITH BIOPSIES, INSTILLATION MITOMYCIN    Benign prostatic hyperplasia with urinary obstruction    Other orders  -     Cystoscopy  -     Diet NPO; Sips with meds; Standing  -     Place sequential compression device; Standing  -     ceFAZolin (ANCEF) 2,000 mg in dextrose 5 % 100 mL IVPB          Subjective:      Patient ID: [place] Aysha Marylyn Homans is a 62 y o  male  Chief complaint:  Bladder cancer and lower urinary tract symptoms    HPI:  78-year-old male followed for the above complaints  The patient has history of low-grade bladder cancer treated last year with an induction course of BCG  He has not had any recurrences to date  He reports he is voiding well  His stream is good and he empties his bladder adequately  He gets up at most once a night to urinate  He has no urgency or urinary incontinence  There is no gross hematuria, dysuria or symptoms of infection  He has no flank pain  He returns today for cystoscopy and follow-up  He has yet to do his PSA testing  The following portions of the patient's history were reviewed and updated as appropriate: allergies, current medications, past family history, past medical history, past social history, past surgical history and problem list     Review of Systems   Constitutional: Negative for diaphoresis, fatigue and fever  HENT: Negative  Eyes: Negative  Respiratory: Negative  Cardiovascular: Negative  Endocrine: Negative  Genitourinary:        See HPI   Musculoskeletal: Negative  Skin: Negative  Allergic/Immunologic: Negative  Neurological: Negative  Hematological: Negative  Psychiatric/Behavioral: Negative  Objective:      /78 (BP Location: Left arm, Patient Position: Sitting, Cuff Size: Standard)   Pulse 64   Ht 6' (1 829 m)   Wt 95 3 kg (210 lb)   BMI 28 48 kg/m²           Physical Exam   Constitutional: He is oriented to person, place, and time  He appears well-developed and well-nourished  HENT:   Head: Normocephalic and atraumatic  Eyes: Conjunctivae are normal    Neck: Neck supple  Cardiovascular: Normal rate  Pulmonary/Chest: Effort normal    Abdominal: Soft  Bowel sounds are normal  He exhibits no distension and no mass  There is no tenderness  There is no rebound, no guarding and no CVA tenderness  No hernia  Genitourinary: Rectum normal, testes normal and penis normal  Right testis shows no mass  Left testis shows no mass  No phimosis or hypospadias  Genitourinary Comments: Prostate 1+ enlarged and palpably benign  Musculoskeletal: He exhibits no edema  Neurological: He is alert and oriented to person, place, and time  Skin: Skin is warm and dry  Psychiatric: He has a normal mood and affect  His behavior is normal  Judgment and thought content normal    Vitals reviewed  Cystoscopy  Date/Time: 9/16/2019 2:00 PM  Performed by: Lea Rushing MD  Authorized by: Lea Rushing MD     Procedure details: cystoscopy    Patient tolerance: Patient tolerated the procedure well with no immediate complications    Additional Procedure Details: Cystoscopy Procedure Note        Pre-operative Diagnosis:  Bladder cancer    Post-operative Diagnosis:  Small papillary recurrence      Procedure Details   The risks, benefits, complications, treatment options, and expected outcomes were discussed with the patient  The patient concurred with the proposed plan, giving informed consent  Cystoscopy was performed today under local anesthesia, using sterile technique  The patient was placed in the supine position, prepped and draped in the usual sterile fashion  A 15 Burundian flexible cystoscope  was used to inspect both the urethra and bladder  Findings:  Normal urethra predominantly bilobar prostatic hypertrophy causing complete outflow obstruction  Ureteral orifices are normal   There is a small papillary lesion on the  trigone  A small cystic lesion covered by normal mucosa is noted on the left posterior wall  This is also small  The remainder the bladder is unremarkable  The bladder is mildly trabeculated

## 2019-09-16 NOTE — ASSESSMENT & PLAN NOTE
The patient is voiding well  Digital rectal examination is benign in nature  Urinalysis is negative  PSA testing will be done in the near future

## 2019-09-18 ENCOUNTER — TELEPHONE (OUTPATIENT)
Dept: UROLOGY | Facility: MEDICAL CENTER | Age: 54
End: 2019-09-18

## 2019-09-18 NOTE — TELEPHONE ENCOUNTER
I left a voice mail message for the patient to call and scheduled his Cyto, BBX, Grzegorz with Dr Enriqueta Jin

## 2019-09-19 NOTE — TELEPHONE ENCOUNTER
Patient left a voice mail message 9/18/19 @ 11:25AM  requesting a called again on his cell#, I call back @ 11:32AM  and reached his voice mail again  Phone call 9/19/19 @ 10:53AM, reached patients voice mail and left message  Left voice mail message for patient 9/20/19 @ [phone]

## 2019-10-08 ENCOUNTER — APPOINTMENT (OUTPATIENT)
Dept: LAB | Facility: HOSPITAL | Age: 54
End: 2019-10-08
Attending: UROLOGY
Payer: COMMERCIAL

## 2019-10-08 ENCOUNTER — HOSPITAL ENCOUNTER (OUTPATIENT)
Dept: NON INVASIVE DIAGNOSTICS | Facility: HOSPITAL | Age: 54
Discharge: HOME | End: 2019-10-08
Attending: UROLOGY
Payer: COMMERCIAL

## 2019-10-08 DIAGNOSIS — C67.4 MALIGNANT NEOPLASM OF POSTERIOR WALL OF URINARY BLADDER (HCC): ICD-10-CM

## 2019-10-08 LAB
ALBUMIN SERPL BCP-MCNC: 4.3 G/DL (ref 3.5–5)
ALP SERPL-CCNC: 41 U/L (ref 46–116)
ALT SERPL W P-5'-P-CCNC: 24 U/L (ref 12–78)
ANION GAP SERPL CALCULATED.3IONS-SCNC: 8 MMOL/L (ref 4–13)
AST SERPL W P-5'-P-CCNC: 21 U/L (ref 5–45)
ATRIAL RATE: 59 BPM
BASOPHILS # BLD AUTO: 0.03 THOUSANDS/ΜL (ref 0–0.1)
BASOPHILS NFR BLD AUTO: 0 % (ref 0–1)
BILIRUB SERPL-MCNC: 0.76 MG/DL (ref 0.2–1)
BUN SERPL-MCNC: 15 MG/DL (ref 5–25)
CALCIUM SERPL-MCNC: 9.5 MG/DL (ref 8.3–10.1)
CHLORIDE SERPL-SCNC: 101 MMOL/L (ref 100–108)
CO2 SERPL-SCNC: 30 MMOL/L (ref 21–32)
CREAT SERPL-MCNC: 1.02 MG/DL (ref 0.6–1.3)
EOSINOPHIL # BLD AUTO: 0.06 THOUSAND/ΜL (ref 0–0.61)
EOSINOPHIL NFR BLD AUTO: 1 % (ref 0–6)
ERYTHROCYTE [DISTWIDTH] IN BLOOD BY AUTOMATED COUNT: 12.1 % (ref 11.6–15.1)
GFR SERPL CREATININE-BSD FRML MDRD: 82 ML/MIN/1.73SQ M
GLUCOSE P FAST SERPL-MCNC: 82 MG/DL (ref 65–99)
HCT VFR BLD AUTO: 40.5 % (ref 36.5–49.3)
HGB BLD-MCNC: 13.3 G/DL (ref 12–17)
IMM GRANULOCYTES # BLD AUTO: 0.05 THOUSAND/UL (ref 0–0.2)
IMM GRANULOCYTES NFR BLD AUTO: 1 % (ref 0–2)
LYMPHOCYTES # BLD AUTO: 1.81 THOUSANDS/ΜL (ref 0.6–4.47)
LYMPHOCYTES NFR BLD AUTO: 26 % (ref 14–44)
MCH RBC QN AUTO: 30.9 PG (ref 26.8–34.3)
MCHC RBC AUTO-ENTMCNC: 32.8 G/DL (ref 31.4–37.4)
MCV RBC AUTO: 94 FL (ref 82–98)
MONOCYTES # BLD AUTO: 0.7 THOUSAND/ΜL (ref 0.17–1.22)
MONOCYTES NFR BLD AUTO: 10 % (ref 4–12)
NEUTROPHILS # BLD AUTO: 4.23 THOUSANDS/ΜL (ref 1.85–7.62)
NEUTS SEG NFR BLD AUTO: 62 % (ref 43–75)
NRBC BLD AUTO-RTO: 0 /100 WBCS
P AXIS: 34 DEGREES
PLATELET # BLD AUTO: 241 THOUSANDS/UL (ref 149–390)
PMV BLD AUTO: 10.6 FL (ref 8.9–12.7)
POTASSIUM SERPL-SCNC: 3.8 MMOL/L (ref 3.5–5.3)
PR INTERVAL: 164 MS
PROT SERPL-MCNC: 7.7 G/DL (ref 6.4–8.2)
QRS AXIS: -10 DEGREES
QRSD INTERVAL: 98 MS
QT INTERVAL: 400 MS
QTC INTERVAL: 396 MS
RBC # BLD AUTO: 4.31 MILLION/UL (ref 3.88–5.62)
SODIUM SERPL-SCNC: 139 MMOL/L (ref 136–145)
T WAVE AXIS: -1 DEGREES
VENTRICULAR RATE: 59 BPM
WBC # BLD AUTO: 6.88 THOUSAND/UL (ref 4.31–10.16)

## 2019-10-08 PROCEDURE — 93005 ELECTROCARDIOGRAM TRACING: CPT

## 2019-10-08 PROCEDURE — 80053 COMPREHEN METABOLIC PANEL: CPT

## 2019-10-08 PROCEDURE — 85025 COMPLETE CBC W/AUTO DIFF WBC: CPT

## 2019-10-08 PROCEDURE — 93010 ELECTROCARDIOGRAM REPORT: CPT

## 2019-10-08 PROCEDURE — 87086 URINE CULTURE/COLONY COUNT: CPT

## 2019-10-08 PROCEDURE — 36415 COLL VENOUS BLD VENIPUNCTURE: CPT

## 2019-10-09 LAB — BACTERIA UR CULT: NORMAL

## 2019-10-14 NOTE — PRE-PROCEDURE INSTRUCTIONS
Pre-Surgery Instructions:   Medication Instructions    Lactobacillus (ACIDOPHILUS PO) Instructed patient per Anesthesia Guidelines  Instructed has no medications to be taken the morning of surgery  No aspirin or NSAIDs until after surgery

## 2019-10-15 PROCEDURE — NC001 PR NO CHARGE: Performed by: UROLOGY

## 2019-10-15 NOTE — H&P
HISTORY AND PHYSICAL  ? ? Patient Name: Marla Salvador  Patient MRN: [id number]  Attending Provider: Conner Talamantes MD  Service: Urology  Chief Complaint    Bladder lesion    HPI   Marla Salvador is a 62 y o  male with small bladder lesion noted on cystoscopy  I plan Cysto, bx and fulguration of lesion and instillation of Mitomycin c  Potential risks and complications discussed, and informed consent was given by the patient  Medications  Meds/Allergies     No current facility-administered medications for this encounter  Cannot display prior to admission medications because the patient has not been admitted in this contact  No current facility-administered medications for this encounter       Current Outpatient Medications:     Lactobacillus (ACIDOPHILUS PO), Take 1 tablet by mouth every other day  , Disp: , Rfl:   Review of Systems  10 point review of systems negative except as noted in HPI  Allergies  No Known Allergies  PMH  Past Medical History:   Diagnosis Date    Asthma     in the past exercise induced/no longer on inhalers    BPH without obstruction/lower urinary tract symptoms     Chronic back pain     Colloid cyst of brain ([facility] [address] ) 05/12/2015    "third ventricle"    GERD (gastroesophageal reflux disease)     Gross hematuria 01/2015    pt reports not recently    Malignant neoplasm of trigone of urinary bladder (HCC)     Numbness and tingling of foot     Right foot,,pt reports today not currently    Wears glasses      Past surgical history  Past Surgical History:   Procedure Laterality Date    BLADDER FULGURATION  04/07/2017    BRAIN SURGERY  05/15/2015    COLONOSCOPY      CYST REMOVAL  05/2015    CYSTOSCOPY  2015    MI CYSTOURETHROSCOPY,FULGUR <0 5 CM LESN N/A 8/16/2018    Procedure: CYSTOSCOPY , BLADDER BIOPSIES WITH FULGURATION;  Surgeon: Conner Talamantes MD;  Location: [facility];  Service: Urology    TRANSURETHRAL RESECTION OF BLADDER TUMOR  2015    2-5 cm    VASECTOMY  2002    WISDOM TOOTH EXTRACTION       Social history  Social History     Tobacco Use    Smoking status: Never Smoker    Smokeless tobacco: Never Used   Substance Use Topics    Alcohol use: Yes     Frequency: 2-4 times a month     Drinks per session: 3 or 4     Comment: weekends    Drug use: No     ?  Physical Exam      Ht 6' (1 829 m)   Wt 95 3 kg (210 lb)   BMI 28 48 kg/m²   General appearance: alert and oriented, in no acute distress  Head: Normocephalic, without obvious abnormality, atraumatic  Neck: supple, symmetrical, trachea midline  Back: symmetric, no curvature  ROM normal  No CVA tenderness    Lungs: clear to auscultation bilaterally  Heart: regular rate and rhythm  Abdomen: soft, non-tender; bowel sounds normal; no masses,  no organomegaly  Male genitalia: normal  Extremities: extremities normal, warm and well-perfused; no cyanosis, clubbing, or edema  Neurologic: Grossly normal     Sandhya Summers MD

## 2019-10-16 ENCOUNTER — ANESTHESIA EVENT (OUTPATIENT)
Dept: PERIOP | Facility: HOSPITAL | Age: 54
End: 2019-10-16
Payer: COMMERCIAL

## 2019-10-17 ENCOUNTER — ANESTHESIA (OUTPATIENT)
Dept: PERIOP | Facility: HOSPITAL | Age: 54
End: 2019-10-17
Payer: COMMERCIAL

## 2019-10-17 ENCOUNTER — HOSPITAL ENCOUNTER (OUTPATIENT)
Facility: HOSPITAL | Age: 54
Setting detail: OUTPATIENT SURGERY
Discharge: HOME | End: 2019-10-17
Attending: UROLOGY | Admitting: UROLOGY
Payer: COMMERCIAL

## 2019-10-17 VITALS
HEIGHT: 72 IN | BODY MASS INDEX: 28.44 KG/M2 | DIASTOLIC BLOOD PRESSURE: 72 MMHG | OXYGEN SATURATION: 95 % | SYSTOLIC BLOOD PRESSURE: 121 MMHG | WEIGHT: 210 LBS | TEMPERATURE: 97.6 F | RESPIRATION RATE: 16 BRPM | HEART RATE: 67 BPM

## 2019-10-17 DIAGNOSIS — C67.0 MALIGNANT NEOPLASM OF TRIGONE OF URINARY BLADDER (HCC): ICD-10-CM

## 2019-10-17 PROCEDURE — 51720 TREATMENT OF BLADDER LESION: CPT | Performed by: UROLOGY

## 2019-10-17 PROCEDURE — 52204 CYSTOSCOPY W/BIOPSY(S): CPT | Performed by: UROLOGY

## 2019-10-17 PROCEDURE — 88305 TISSUE EXAM BY PATHOLOGIST: CPT | Performed by: PATHOLOGY

## 2019-10-17 PROCEDURE — NC001 PR NO CHARGE: Performed by: UROLOGY

## 2019-10-17 RX ORDER — SODIUM CHLORIDE 9 MG/ML
125 INJECTION, SOLUTION INTRAVENOUS CONTINUOUS
Status: DISCONTINUED | OUTPATIENT
Start: 2019-10-17 | End: 2019-10-17 | Stop reason: HOSPADM

## 2019-10-17 RX ORDER — ONDANSETRON 2 MG/ML
4 INJECTION INTRAMUSCULAR; INTRAVENOUS ONCE AS NEEDED
Status: DISCONTINUED | OUTPATIENT
Start: 2019-10-17 | End: 2019-10-17 | Stop reason: HOSPADM

## 2019-10-17 RX ORDER — FENTANYL CITRATE 50 UG/ML
INJECTION, SOLUTION INTRAMUSCULAR; INTRAVENOUS AS NEEDED
Status: DISCONTINUED | OUTPATIENT
Start: 2019-10-17 | End: 2019-10-17 | Stop reason: SURG

## 2019-10-17 RX ORDER — FENTANYL CITRATE/PF 50 MCG/ML
50 SYRINGE (ML) INJECTION
Status: DISCONTINUED | OUTPATIENT
Start: 2019-10-17 | End: 2019-10-17 | Stop reason: HOSPADM

## 2019-10-17 RX ORDER — CEFAZOLIN SODIUM 2 G/50ML
2000 SOLUTION INTRAVENOUS ONCE
Status: COMPLETED | OUTPATIENT
Start: 2019-10-17 | End: 2019-10-17

## 2019-10-17 RX ORDER — PHENAZOPYRIDINE HYDROCHLORIDE 200 MG/1
200 TABLET, FILM COATED ORAL 3 TIMES DAILY PRN
Qty: 10 TABLET | Refills: 0 | Status: SHIPPED | OUTPATIENT
Start: 2019-10-17 | End: 2020-01-20 | Stop reason: ALTCHOICE

## 2019-10-17 RX ORDER — ONDANSETRON 2 MG/ML
4 INJECTION INTRAMUSCULAR; INTRAVENOUS EVERY 6 HOURS PRN
Status: DISCONTINUED | OUTPATIENT
Start: 2019-10-17 | End: 2019-10-17 | Stop reason: HOSPADM

## 2019-10-17 RX ORDER — PROPOFOL 10 MG/ML
INJECTION, EMULSION INTRAVENOUS AS NEEDED
Status: DISCONTINUED | OUTPATIENT
Start: 2019-10-17 | End: 2019-10-17 | Stop reason: SURG

## 2019-10-17 RX ORDER — MIDAZOLAM HYDROCHLORIDE 1 MG/ML
INJECTION INTRAMUSCULAR; INTRAVENOUS AS NEEDED
Status: DISCONTINUED | OUTPATIENT
Start: 2019-10-17 | End: 2019-10-17 | Stop reason: SURG

## 2019-10-17 RX ORDER — PHENAZOPYRIDINE HYDROCHLORIDE 200 MG/1
200 TABLET, FILM COATED ORAL ONCE AS NEEDED
Status: DISCONTINUED | OUTPATIENT
Start: 2019-10-17 | End: 2019-10-17 | Stop reason: HOSPADM

## 2019-10-17 RX ORDER — ACETAMINOPHEN 325 MG/1
650 TABLET ORAL EVERY 6 HOURS PRN
Status: DISCONTINUED | OUTPATIENT
Start: 2019-10-17 | End: 2019-10-17 | Stop reason: HOSPADM

## 2019-10-17 RX ORDER — ONDANSETRON 2 MG/ML
INJECTION INTRAMUSCULAR; INTRAVENOUS AS NEEDED
Status: DISCONTINUED | OUTPATIENT
Start: 2019-10-17 | End: 2019-10-17 | Stop reason: SURG

## 2019-10-17 RX ORDER — KETOROLAC TROMETHAMINE 30 MG/ML
INJECTION, SOLUTION INTRAMUSCULAR; INTRAVENOUS AS NEEDED
Status: DISCONTINUED | OUTPATIENT
Start: 2019-10-17 | End: 2019-10-17 | Stop reason: SURG

## 2019-10-17 RX ORDER — KETOROLAC TROMETHAMINE 30 MG/ML
15 INJECTION, SOLUTION INTRAMUSCULAR; INTRAVENOUS EVERY 6 HOURS SCHEDULED
Status: DISCONTINUED | OUTPATIENT
Start: 2019-10-17 | End: 2019-10-17 | Stop reason: HOSPADM

## 2019-10-17 RX ORDER — OXYCODONE HYDROCHLORIDE 5 MG/1
5 TABLET ORAL EVERY 4 HOURS PRN
Status: DISCONTINUED | OUTPATIENT
Start: 2019-10-17 | End: 2019-10-17 | Stop reason: HOSPADM

## 2019-10-17 RX ORDER — DEXAMETHASONE SODIUM PHOSPHATE 4 MG/ML
INJECTION, SOLUTION INTRA-ARTICULAR; INTRALESIONAL; INTRAMUSCULAR; INTRAVENOUS; SOFT TISSUE AS NEEDED
Status: DISCONTINUED | OUTPATIENT
Start: 2019-10-17 | End: 2019-10-17 | Stop reason: SURG

## 2019-10-17 RX ORDER — MAGNESIUM HYDROXIDE 1200 MG/15ML
LIQUID ORAL AS NEEDED
Status: DISCONTINUED | OUTPATIENT
Start: 2019-10-17 | End: 2019-10-17 | Stop reason: HOSPADM

## 2019-10-17 RX ADMIN — PROPOFOL 300 MG: 10 INJECTION, EMULSION INTRAVENOUS at 09:20

## 2019-10-17 RX ADMIN — SODIUM CHLORIDE: 0.9 INJECTION, SOLUTION INTRAVENOUS at 09:54

## 2019-10-17 RX ADMIN — SODIUM CHLORIDE 125 ML/HR: 0.9 INJECTION, SOLUTION INTRAVENOUS at 08:23

## 2019-10-17 RX ADMIN — FENTANYL CITRATE 25 MCG: 50 INJECTION, SOLUTION INTRAMUSCULAR; INTRAVENOUS at 09:37

## 2019-10-17 RX ADMIN — DEXAMETHASONE SODIUM PHOSPHATE 4 MG: 4 INJECTION, SOLUTION INTRAMUSCULAR; INTRAVENOUS at 09:28

## 2019-10-17 RX ADMIN — FENTANYL CITRATE 25 MCG: 50 INJECTION, SOLUTION INTRAMUSCULAR; INTRAVENOUS at 09:22

## 2019-10-17 RX ADMIN — FENTANYL CITRATE 25 MCG: 50 INJECTION, SOLUTION INTRAMUSCULAR; INTRAVENOUS at 09:23

## 2019-10-17 RX ADMIN — FENTANYL CITRATE 25 MCG: 50 INJECTION, SOLUTION INTRAMUSCULAR; INTRAVENOUS at 09:27

## 2019-10-17 RX ADMIN — KETOROLAC TROMETHAMINE 30 MG: 30 INJECTION, SOLUTION INTRAMUSCULAR at 09:44

## 2019-10-17 RX ADMIN — CEFAZOLIN SODIUM 2000 MG: 2 SOLUTION INTRAVENOUS at 09:08

## 2019-10-17 RX ADMIN — MIDAZOLAM 2 MG: 1 INJECTION INTRAMUSCULAR; INTRAVENOUS at 09:11

## 2019-10-17 RX ADMIN — ONDANSETRON 4 MG: 2 INJECTION INTRAMUSCULAR; INTRAVENOUS at 09:25

## 2019-10-17 NOTE — DISCHARGE SUMMARY
DISCHARGE SUMMARY     Patient Name: Eder Patel    Patient MRN: [id number]    Admitting Provider: Daniela Brown MD    Discharging Provider: Daniela Brown MD    Primary Care Physician at Discharge: Lilia Curm [phone]     Admission Date: 10/17/2019     Discharge Date: 10/17/2019    Admission Diagnosis   Malignant ne[facility] trig[facility] ([facility] ) [C67 0]    Discharge Diagnoses  Principal Problem:    Malignant neoplasm of trigone of urinary bladder ([facility] )      Medications  Current Discharge Medication List      START taking these medications    Details   phenazopyridine (PYRIDIUM) 200 mg tablet Take 1 tablet (200 mg total) by mouth 3 (three) times a day as needed for bladder spasms Take with meals  Qty: 10 tablet, Refills: 0    Associated Diagnoses: Malignant neoplasm of trigone of urinary bladder [facility])            Current Discharge Medication List          Allergies  No Known Allergies    Outpatient Follow-Up  Daniela Brown MD  [address] [place], [place] [address] [address]  [phone]    Follow up  Please keep any pre arranged appointments  If you do not yet have a follow-up appointment to review the pathology the office will call to arrange  ? Discharge Disposition  Home    Operative Procedures Performed  Procedure(s):  CYSTOSCOPY WITH BIOPSIES  INSTILLATION MITOMYCIN  ? Physical Exam at Discharge  Condition of Patient on Discharge: stable  ?   Daniela Brown MD

## 2019-10-17 NOTE — ANESTHESIA PREPROCEDURE EVALUATION
Review of Systems/Medical History  Patient summary reviewed  Chart reviewed  No history of anesthetic complications     Cardiovascular  Negative cardio ROS    Pulmonary  Asthma , seasonal/exercise induced Last rescue: > 1 year ago Asthma type of rescue: PRN inhaler,        GI/Hepatic    GERD well controlled,        Genitourinary malignancy Bladder cancer,        Endo/Other  Negative endo/other ROS      GYN       Hematology  Negative hematology ROS      Musculoskeletal  Negative musculoskeletal ROS        Neurology      Comment: S/p brain surgery Psychology   Negative psychology ROS              Physical Exam    Airway    Mallampati score: II  TM Distance: >3 FB  Neck ROM: full     Dental   No notable dental hx     Cardiovascular  Comment: Negative ROS, Rhythm: regular, Rate: normal, Cardiovascular exam normal    Pulmonary  Pulmonary exam normal Breath sounds clear to auscultation,     Other Findings        Anesthesia Plan  ASA Score- 2     Anesthesia Type- general and IV sedation with anesthesia with ASA Monitors  Additional Monitors:   Airway Plan:         Plan Factors-Patient not instructed to abstain from smoking on day of procedure  Patient did not smoke on day of surgery  Induction- intravenous  Postoperative Plan-     Informed Consent- Anesthetic plan and risks discussed with patient

## 2019-10-17 NOTE — OP NOTE
OPERATIVE REPORT  PATIENT NAME: Eladio Abreu    :  1965  MRN: [id number]  Pt Location: [facility] OR ROOM 01    SURGERY DATE: 10/17/2019    Surgeon(s) and Role:     * Gerald Banda MD - Primary    Preop Diagnosis:  Malignant neoplasm of trigone of urinary bladder ([facility] [address] ) [C67 0]    Post-Op Diagnosis Codes:     * Malignant neoplasm of trigone of urinary bladder (HCC) [C67 0]    Procedure(s) (LRB):  CYSTOSCOPY WITH BIOPSIES (N/A)  INSTILLATION MITOMYCIN (N/A)    Specimen(s):  ID Type Source Tests Collected by Time Destination   1 : papillary lesion post  bladder wall behind trigone Tissue Urinary Bladder TISSUE EXAM Gerald Banda MD 10/17/2019 0245    2 : CYSTIC LESION POST  BLADDER WALL Tissue Urinary Bladder TISSUE EXAM Gerald Banda MD 10/17/2019 0422        Estimated Blood Loss:   Minimal    Drains:  * No LDAs found *    Anesthesia Type:   Choice    Operative Indications:  Malignant neoplasm of trigone of urinary bladder (HCC) [C67 0]      Operative Findings:  Normal urethra predominantly bilobar prostatic hypertrophy causing complete outflow obstruction  There has been resection around the right ureteral orifice but this is open  The left ureteral orifice is normal   The bladder is mildly trabeculated  There is a small papillary lesion approximately 0 5 cm noted behind the right ana trigone-this was biopsied and completely removed  The biopsy site and surrounding mucosa were then fulgurated  On the posterior bladder wall was 0 5 cm yellowish cystic lesion-this too was biopsied and fulgurated  No other lesions were seen  Clear efflux was seen from each ureteral orifice  A 20 Taiwanese Hayward catheter was placed  Mitomycin-C was then slowly instilled into the bladder  The Hayward catheter was clamped  Blood loss was minimal     Complications:   None    Procedure and Technique:  The patient is brought to the operating room properly identified  General anesthesia was administered    He was placed in lithotomy position prepped draped in usual sterile fashion  Compression boots were employed  Intravenous antibiotic administered an appropriate time-out performed  Cystourethroscopy was performed with 25 Uzbek cystoscope with findings as above  The flexible cold cup biopsy forceps was then utilized to biopsy and remove both lesions  The Bugbee electrode was used to fulgurate the biopsy sites  Hemostasis appeared adequate  The cystoscope was removed with the bladder full  A 20 Uzbek Hayward catheter was then easily placed into the bladder and the balloon filled to 10 cc  The catheter irrigated well and irrigant was clear  Mitomycin-C was then slowly instilled into the bladder  The catheter was clamped  The patient tolerated procedure well  Blood loss was minimal   He was taken to the recovery room in satisfactory condition       I was present for the entire procedure    Patient Disposition:  PACU     SIGNATURE: Katherin Shone, MD  DATE: October 17, 2019  TIME: 10:09 AM

## 2019-10-17 NOTE — DISCHARGE INSTRUCTIONS

## 2019-10-17 NOTE — DISCHARGE INSTR - AVS FIRST PAGE
Use Tylenol and/or ibuprofen for pain  The pyridium prescribed can be used for urinary burning  Pyridium will cause the urine to be orange  Call for fever, difficulty voiding, heavy urinary bleeding or severe abdominal pain

## 2019-10-17 NOTE — INTERVAL H&P NOTE
H&P reviewed  After examining the patient I find no changes in the patients condition since the H&P had been written  Vitals:    10/17/19 0810   BP: 159/98   Pulse: 81   Resp: 16   Temp: 97 8 °F (36 6 °C)   SpO2: 96%   Procedure, risks and rationale for mitomycin C usage reviewed with the patient in the holding unit preoperatively

## 2019-10-22 ENCOUNTER — TELEPHONE (OUTPATIENT)
Dept: UROLOGY | Facility: MEDICAL CENTER | Age: 54
End: 2019-10-22

## 2019-10-22 NOTE — TELEPHONE ENCOUNTER
Pt managed by Kei Chew called to inform office he will not be able to make scheduled appointment 10/23/19 due to his work schedule, I spoke with Nimo Starr who is going to relay this information to Kei Chew to see if results could be discussed via phone or if appointment needed which pt will need after 3pm

## 2019-10-23 ENCOUNTER — TELEPHONE (OUTPATIENT)
Dept: UROLOGY | Facility: MEDICAL CENTER | Age: 54
End: 2019-10-23

## 2019-10-24 NOTE — TELEPHONE ENCOUNTER
LMOM to call back to make appt for a cystoscopy with Eladio Major on his procedure day in mid to late January

## 2019-10-25 NOTE — TELEPHONE ENCOUNTER
WILMAN to call back to make appt for a cystoscopy with Guido Millan on his procedure day in mid to late January

## 2020-01-20 ENCOUNTER — PROCEDURE VISIT (OUTPATIENT)
Dept: UROLOGY | Facility: MEDICAL CENTER | Age: 55
End: 2020-01-20
Payer: COMMERCIAL

## 2020-01-20 VITALS
SYSTOLIC BLOOD PRESSURE: 142 MMHG | BODY MASS INDEX: 28.44 KG/M2 | HEART RATE: 97 BPM | DIASTOLIC BLOOD PRESSURE: 98 MMHG | HEIGHT: 72 IN | WEIGHT: 210 LBS

## 2020-01-20 DIAGNOSIS — N13.8 BENIGN PROSTATIC HYPERPLASIA WITH URINARY OBSTRUCTION: ICD-10-CM

## 2020-01-20 DIAGNOSIS — N40.1 BENIGN PROSTATIC HYPERPLASIA WITH URINARY OBSTRUCTION: ICD-10-CM

## 2020-01-20 DIAGNOSIS — C67.4 MALIGNANT NEOPLASM OF POSTERIOR WALL OF URINARY BLADDER (HCC): Primary | ICD-10-CM

## 2020-01-20 LAB
SL AMB  POCT GLUCOSE, UA: ABNORMAL
SL AMB LEUKOCYTE ESTERASE,UA: ABNORMAL
SL AMB POCT BILIRUBIN,UA: ABNORMAL
SL AMB POCT BLOOD,UA: ABNORMAL
SL AMB POCT CLARITY,UA: CLEAR
SL AMB POCT COLOR,UA: YELLOW
SL AMB POCT KETONES,UA: ABNORMAL
SL AMB POCT NITRITE,UA: ABNORMAL
SL AMB POCT PH,UA: 7.5
SL AMB POCT SPECIFIC GRAVITY,UA: 1.01
SL AMB POCT URINE PROTEIN: ABNORMAL
SL AMB POCT UROBILINOGEN: 0.2

## 2020-01-20 PROCEDURE — 52000 CYSTOURETHROSCOPY: CPT | Performed by: UROLOGY

## 2020-01-20 PROCEDURE — 81003 URINALYSIS AUTO W/O SCOPE: CPT | Performed by: UROLOGY

## 2020-01-20 RX ORDER — PENICILLIN V POTASSIUM 250 MG/1
TABLET ORAL
COMMUNITY
Start: 2019-12-11 | End: 2020-01-20 | Stop reason: ALTCHOICE

## 2020-01-20 RX ORDER — AMOXICILLIN 500 MG/1
CAPSULE ORAL
COMMUNITY
Start: 2019-12-27 | End: 2020-01-20 | Stop reason: ALTCHOICE

## 2020-01-20 NOTE — LETTER
January 20, 2020     Sarthak Tran MD  [facility] [address]    Patient: Beba Peterson   YOB: 1965   Date of Visit: 1/20/2020       Dear Dr Raymond Rouse: Thank you for referring Beba Peterson to me for evaluation  Below are my notes for this consultation  If you have questions, please do not hesitate to call me  I look forward to following your patient along with you  Sincerely,        Ángel Puga MD        CC: No Recipients  Ángel Puga MD  1/20/2020  3:50 PM  Sign at close encounter  Cystoscopy  Date/Time: 1/20/2020 3:45 PM  Performed by: Ángel Puga MD  Authorized by: Ángel Puga MD     Procedure details: cystoscopy    Patient tolerance: Patient tolerated the procedure well with no immediate complications    Additional Procedure Details: Cystoscopy Procedure Note        Pre-operative Diagnosis:  History of bladder cancer    Post-operative Diagnosis:  No evidence of recurrence      Procedure Details   The risks, benefits, complications, treatment options, and expected outcomes were discussed with the patient  The patient concurred with the proposed plan, giving informed consent  Cystoscopy was performed today under local anesthesia, using sterile technique  The patient was placed in the supine position, prepped and draped in the usual sterile fashion  A 15 Saudi Arabian flexible cystoscope  was used to inspect both the urethra and bladder  Findings:  Normal urethra with probably bilobar prostatic hypertrophy causing almost complete outflow obstruction  Ureteral orifices are normal   The bladder is mildly trabeculated  There are no stones, tumors or diverticula  Scar can be seen at the site of previous resection  Specimens:  Urinalysis is negative for blood  Discussion:  He is voiding well and has no complaints  There is no gross hematuria    Pathology in October revealed a low-grade papillary neoplasm  I recommended to the patient that he return for cystoscopy in 6 months  We will plan to check a PSA in the interim

## 2020-01-20 NOTE — PROGRESS NOTES
Cystoscopy  Date/Time: 1/20/2020 3:45 PM  Performed by: Mariama Meneses MD  Authorized by: Mariama Meneses MD     Procedure details: cystoscopy    Patient tolerance: Patient tolerated the procedure well with no immediate complications    Additional Procedure Details: Cystoscopy Procedure Note        Pre-operative Diagnosis:  History of bladder cancer    Post-operative Diagnosis:  No evidence of recurrence      Procedure Details   The risks, benefits, complications, treatment options, and expected outcomes were discussed with the patient  The patient concurred with the proposed plan, giving informed consent  Cystoscopy was performed today under local anesthesia, using sterile technique  The patient was placed in the supine position, prepped and draped in the usual sterile fashion  A 15 Slovenian flexible cystoscope  was used to inspect both the urethra and bladder  Findings:  Normal urethra with probably bilobar prostatic hypertrophy causing almost complete outflow obstruction  Ureteral orifices are normal   The bladder is mildly trabeculated  There are no stones, tumors or diverticula  Scar can be seen at the site of previous resection  Specimens:  Urinalysis is negative for blood  Discussion:  He is voiding well and has no complaints  There is no gross hematuria  Pathology in October revealed a low-grade papillary neoplasm  I recommended to the patient that he return for cystoscopy in 6 months  We will plan to check a PSA in the interim

## 2020-07-27 ENCOUNTER — PROCEDURE VISIT (OUTPATIENT)
Dept: UROLOGY | Facility: MEDICAL CENTER | Age: 55
End: 2020-07-27
Payer: COMMERCIAL

## 2020-07-27 VITALS
HEIGHT: 72 IN | DIASTOLIC BLOOD PRESSURE: 94 MMHG | TEMPERATURE: 98.6 F | SYSTOLIC BLOOD PRESSURE: 140 MMHG | BODY MASS INDEX: 26.41 KG/M2 | WEIGHT: 195 LBS

## 2020-07-27 DIAGNOSIS — N13.8 BENIGN PROSTATIC HYPERPLASIA WITH URINARY OBSTRUCTION: ICD-10-CM

## 2020-07-27 DIAGNOSIS — C67.4 MALIGNANT NEOPLASM OF POSTERIOR WALL OF URINARY BLADDER (HCC): Primary | ICD-10-CM

## 2020-07-27 DIAGNOSIS — C67.0 MALIGNANT NEOPLASM OF TRIGONE OF URINARY BLADDER (HCC): ICD-10-CM

## 2020-07-27 DIAGNOSIS — N40.1 BENIGN PROSTATIC HYPERPLASIA WITH URINARY OBSTRUCTION: ICD-10-CM

## 2020-07-27 LAB
SL AMB  POCT GLUCOSE, UA: ABNORMAL
SL AMB LEUKOCYTE ESTERASE,UA: ABNORMAL
SL AMB POCT BILIRUBIN,UA: ABNORMAL
SL AMB POCT BLOOD,UA: ABNORMAL
SL AMB POCT CLARITY,UA: CLEAR
SL AMB POCT COLOR,UA: YELLOW
SL AMB POCT KETONES,UA: ABNORMAL
SL AMB POCT NITRITE,UA: ABNORMAL
SL AMB POCT PH,UA: 5.5
SL AMB POCT SPECIFIC GRAVITY,UA: 1.02
SL AMB POCT URINE PROTEIN: ABNORMAL
SL AMB POCT UROBILINOGEN: 0.2

## 2020-07-27 PROCEDURE — 52000 CYSTOURETHROSCOPY: CPT | Performed by: UROLOGY

## 2020-07-27 PROCEDURE — 81003 URINALYSIS AUTO W/O SCOPE: CPT | Performed by: UROLOGY

## 2020-07-27 NOTE — LETTER
July 27, 2020     Nicole Kerr MD  [facility] [address]    Patient: Sánchez Nieves   YOB: 1965   Date of Visit: 7/27/2020       Dear Dr Malini Lema: Thank you for referring Sánchez Nieves to me for evaluation  Below are my notes for this consultation  If you have questions, please do not hesitate to call me  I look forward to following your patient along with you  Sincerely,        Keerthi Enriquez MD        CC: No Recipients  Keerthi Enriquez MD  7/27/2020  2:29 PM  Sign at close encounter  Cystoscopy Procedure Note        Pre-operative Diagnosis: History of low-grade  bladder cancer    Post-operative Diagnosis:  No evidence of recurrence      Procedure Details   The risks, benefits, complications, treatment options, and expected outcomes were discussed with the patient  The patient concurred with the proposed plan, giving informed consent  Cystoscopy was performed today under local anesthesia, using sterile technique  The patient was placed in the supine position, prepped and draped in the usual sterile fashion  A 15 Tristanian flexible cystoscope  was used to inspect both the urethra and bladder  Findings:  Normal urethra predominantly bilobar prostatic hypertrophy causing complete outflow obstruction  Ureteral orifices are unremarkable  The trigone is unremarkable  The bladder is mild to moderately trabeculated  There are no stones, tumors or diverticula  Specimens:   No blood is noted on dipstick                          Discussion: the patient has a history of low-grade bladder cancer  Urinalysis is negative and cystoscopy today unremarkable  He will return in 6 months for repeat cystoscopy  We will plan to check a PSA level and perform JASON at his next visit

## 2020-07-27 NOTE — PROGRESS NOTES
Cystoscopy Procedure Note        Pre-operative Diagnosis: History of low-grade  bladder cancer    Post-operative Diagnosis:  No evidence of recurrence      Procedure Details   The risks, benefits, complications, treatment options, and expected outcomes were discussed with the patient  The patient concurred with the proposed plan, giving informed consent  Cystoscopy was performed today under local anesthesia, using sterile technique  The patient was placed in the supine position, prepped and draped in the usual sterile fashion  A 15 Divehi flexible cystoscope  was used to inspect both the urethra and bladder  Findings:  Normal urethra predominantly bilobar prostatic hypertrophy causing complete outflow obstruction  Ureteral orifices are unremarkable  The trigone is unremarkable  The bladder is mild to moderately trabeculated  There are no stones, tumors or diverticula  Specimens:   No blood is noted on dipstick                          Discussion: the patient has a history of low-grade bladder cancer  Urinalysis is negative and cystoscopy today unremarkable  He will return in 6 months for repeat cystoscopy  We will plan to check a PSA level and perform JASON at his next visit

## 2021-02-08 ENCOUNTER — PROCEDURE VISIT (OUTPATIENT)
Dept: UROLOGY | Facility: MEDICAL CENTER | Age: 56
End: 2021-02-08
Payer: COMMERCIAL

## 2021-02-08 VITALS — WEIGHT: 200 LBS | HEIGHT: 72 IN | BODY MASS INDEX: 27.09 KG/M2

## 2021-02-08 DIAGNOSIS — N13.8 BENIGN PROSTATIC HYPERPLASIA WITH URINARY OBSTRUCTION: ICD-10-CM

## 2021-02-08 DIAGNOSIS — C67.4 MALIGNANT NEOPLASM OF POSTERIOR WALL OF URINARY BLADDER (HCC): Primary | ICD-10-CM

## 2021-02-08 DIAGNOSIS — N40.1 BENIGN PROSTATIC HYPERPLASIA WITH URINARY OBSTRUCTION: ICD-10-CM

## 2021-02-08 LAB
SL AMB  POCT GLUCOSE, UA: NORMAL
SL AMB LEUKOCYTE ESTERASE,UA: NORMAL
SL AMB POCT BILIRUBIN,UA: NORMAL
SL AMB POCT BLOOD,UA: NORMAL
SL AMB POCT CLARITY,UA: CLEAR
SL AMB POCT COLOR,UA: YELLOW
SL AMB POCT KETONES,UA: NORMAL
SL AMB POCT NITRITE,UA: NORMAL
SL AMB POCT PH,UA: 5.5
SL AMB POCT SPECIFIC GRAVITY,UA: >=1.03
SL AMB POCT URINE PROTEIN: NORMAL
SL AMB POCT UROBILINOGEN: 0.2

## 2021-02-08 PROCEDURE — 52000 CYSTOURETHROSCOPY: CPT | Performed by: UROLOGY

## 2021-02-08 PROCEDURE — 81003 URINALYSIS AUTO W/O SCOPE: CPT | Performed by: UROLOGY

## 2021-02-08 PROCEDURE — 99213 OFFICE O/P EST LOW 20 MIN: CPT | Performed by: UROLOGY

## 2021-02-08 NOTE — LETTER
February 8, 2021     Eva Ramos MD  [facility] [address]    Patient: Aurea Alpers   YOB: 1965   Date of Visit: 2/8/2021       Dear Dr Nick Maharaj: Thank you for referring Aurea Alpers to me for evaluation  Below are my notes for this consultation  If you have questions, please do not hesitate to call me  I look forward to following your patient along with you  Sincerely,        Katiuska Wyatt MD        CC: No Recipients  Katiuska Wyatt MD  2/8/2021  3:40 PM  Sign when Signing Visit  Assessment/Plan:    Malignant neoplasm of posterior wall of urinary bladder ([address] )  No evidence of recurrence is noted  Urinalysis is negative  We will plan to repeat his cystoscopy in 1 year  Benign prostatic hyperplasia with urinary obstruction    The patient is voiding well  AUA symptom score is 2  He is pleased with his voiding pattern  PSA is 1 0 ( January 29, 2021)  We will continue to follow his voiding pattern watchful waiting  He will return in 1 year and we will check a PSA prior to his next visit  Diagnoses and all orders for this visit:    Malignant neoplasm of posterior wall of urinary bladder (HCC)  -     POCT urine dip auto non-scope  -     Urinalysis with microscopic; Future    Benign prostatic hyperplasia with urinary obstruction  -     PSA Total, Diagnostic; Future  -     Urinalysis with microscopic; Future          Subjective:      Patient ID: Aurea Alpers is a 60 y o  male  Benign Prostatic Hypertrophy  This is a chronic problem  The current episode started more than 1 year ago  The problem is unchanged  Irritative symptoms do not include frequency, nocturia (Nocturia x 0-1) or urgency  Obstructive symptoms do not include dribbling, incomplete emptying, an intermittent stream, a slower stream or straining  Pertinent negatives include no chills, dysuria, genital pain, hematuria or hesitancy  AUA score is 0-7   His sexual activity is non-contributory to the current illness  The symptoms are aggravated by caffeine  Past treatments include nothing  Bladder cancer  History of low-grade TCC with last recurrence October 2017  He denies gross hematuria, dysuria or symptoms of infection  No flank pain  He returns today for office cystoscopy to exclude recurrence  The following portions of the patient's history were reviewed and updated as appropriate: allergies, current medications, past family history, past medical history, past social history, past surgical history and problem list     Review of Systems   Constitutional: Negative for chills, diaphoresis, fatigue and fever  HENT: Negative  Eyes: Negative  Respiratory: Negative  Cardiovascular: Negative  Gastrointestinal: Negative  Endocrine: Negative  Genitourinary: Negative for dysuria, frequency, hematuria, hesitancy, incomplete emptying, nocturia (Nocturia x 0-1) and urgency  See HPI   Musculoskeletal: Negative  Skin: Negative  Allergic/Immunologic: Negative  Neurological: Negative  Hematological: Negative  Psychiatric/Behavioral: Negative  AUA SYMPTOM SCORE      Most Recent Value   AUA SYMPTOM SCORE   How often have you had a sensation of not emptying your bladder completely after you finished urinating? 0   How often have you had to urinate again less than two hours after you finished urinating? 1   How often have you found you stopped and started again several times when you urinate?  0   How often have you found it difficult to postpone urination? 0   How often have you had a weak urinary stream?  0   How often have you had to push or strain to begin urination? 0   How many times did you most typically get up to urinate from the time you went to bed at night until the time you got up in the morning?   1   Quality of Life: If you were to spend the rest of your life with your urinary condition just the way it is now, how would you feel about that?  0   AUA SYMPTOM SCORE  2        Objective:      Ht 6' (1 829 m)   Wt 90 7 kg (200 lb)   BMI 27 12 kg/m²          Physical Exam  Vitals signs reviewed  Constitutional:       General: He is not in acute distress  Appearance: He is well-developed  He is not diaphoretic  HENT:      Head: Normocephalic and atraumatic  Eyes:      General: No scleral icterus  Conjunctiva/sclera: Conjunctivae normal    Neck:      Musculoskeletal: Neck supple  Cardiovascular:      Rate and Rhythm: Normal rate  Pulmonary:      Effort: Pulmonary effort is normal    Abdominal:      General: Bowel sounds are normal  There is no distension  Palpations: Abdomen is soft  There is no mass  Tenderness: There is no abdominal tenderness  There is no right CVA tenderness, left CVA tenderness, guarding or rebound  Hernia: No hernia is present  Genitourinary:     Penis: Normal  No phimosis or hypospadias  Scrotum/Testes: Normal          Right: Mass not present  Left: Mass not present  Rectum: Normal       Comments: Prostate 1+ enlarged and palpably benign  Musculoskeletal: Normal range of motion  Skin:     General: Skin is warm and dry  Neurological:      General: No focal deficit present  Mental Status: He is alert and oriented to person, place, and time  Psychiatric:         Mood and Affect: Mood normal          Behavior: Behavior normal          Thought Content: Thought content normal          Judgment: Judgment normal           Cystoscopy     Date/Time 2/8/2021 3:38 PM     Performed by  Manuela Hauser MD     Authorized by Manuela Hauser MD      Universal Protocol:  Consent: Verbal consent obtained  Written consent obtained    Risks and benefits: risks, benefits and alternatives were discussed  Patient understanding: patient states understanding of the procedure being performed  Patient identity confirmed: verbally with patient        Procedure Details:  Procedure type: cystoscopy    Patient tolerance: Patient tolerated the procedure well with no immediate complications    Additional Procedure Details:      Patient presents for cystoscopy  I have discussed the reasons for doing the test, and the potential risks and complications  Patient expressed understanding, and signed informed consent document  The patient was carefully  positioned supine on the examining table  Sterile preparation was performed on the urethra  Xylocaine jelly was instilled and left  Indwelling for the procedure  The 13 [place] flexible cystoscope was passed with the following findings:      Urethra:   Normal without stricture    Prostate:  lateral lobes -Moderate bilobar hyperplasia causing visual outlet obstruction  median lobe -  No significant median lobe    Bladder:  Mild trabeculation, no lesions, tumor, or stones  Residual urine: small    Patient tolerated the procedure well and was escorted from the examining table

## 2021-02-08 NOTE — PROGRESS NOTES
Assessment/Plan:    Malignant neoplasm of posterior wall of urinary bladder (HCC)  No evidence of recurrence is noted  Urinalysis is negative  We will plan to repeat his cystoscopy in 1 year  Benign prostatic hyperplasia with urinary obstruction    The patient is voiding well  AUA symptom score is 2  He is pleased with his voiding pattern  PSA is 1 0 ( January 29, 2021)  We will continue to follow his voiding pattern watchful waiting  He will return in 1 year and we will check a PSA prior to his next visit  Diagnoses and all orders for this visit:    Malignant neoplasm of posterior wall of urinary bladder (HCC)  -     POCT urine dip auto non-scope  -     Urinalysis with microscopic; Future    Benign prostatic hyperplasia with urinary obstruction  -     PSA Total, Diagnostic; Future  -     Urinalysis with microscopic; Future          Subjective:      Patient ID: Pamela Torres is a 60 y o  male  Benign Prostatic Hypertrophy  This is a chronic problem  The current episode started more than 1 year ago  The problem is unchanged  Irritative symptoms do not include frequency, nocturia (Nocturia x 0-1) or urgency  Obstructive symptoms do not include dribbling, incomplete emptying, an intermittent stream, a slower stream or straining  Pertinent negatives include no chills, dysuria, genital pain, hematuria or hesitancy  AUA score is 0-7  His sexual activity is non-contributory to the current illness  The symptoms are aggravated by caffeine  Past treatments include nothing  Bladder cancer  History of low-grade TCC with last recurrence October 2017  He denies gross hematuria, dysuria or symptoms of infection  No flank pain  He returns today for office cystoscopy to exclude recurrence      The following portions of the patient's history were reviewed and updated as appropriate: allergies, current medications, past family history, past medical history, past social history, past surgical history and problem list     Review of Systems   Constitutional: Negative for chills, diaphoresis, fatigue and fever  HENT: Negative  Eyes: Negative  Respiratory: Negative  Cardiovascular: Negative  Gastrointestinal: Negative  Endocrine: Negative  Genitourinary: Negative for dysuria, frequency, hematuria, hesitancy, incomplete emptying, nocturia (Nocturia x 0-1) and urgency  See HPI   Musculoskeletal: Negative  Skin: Negative  Allergic/Immunologic: Negative  Neurological: Negative  Hematological: Negative  Psychiatric/Behavioral: Negative  AUA SYMPTOM SCORE      Most Recent Value   AUA SYMPTOM SCORE   How often have you had a sensation of not emptying your bladder completely after you finished urinating? 0   How often have you had to urinate again less than two hours after you finished urinating? 1   How often have you found you stopped and started again several times when you urinate?  0   How often have you found it difficult to postpone urination? 0   How often have you had a weak urinary stream?  0   How often have you had to push or strain to begin urination? 0   How many times did you most typically get up to urinate from the time you went to bed at night until the time you got up in the morning? 1   Quality of Life: If you were to spend the rest of your life with your urinary condition just the way it is now, how would you feel about that?  0   AUA SYMPTOM SCORE  2        Objective:      Ht 6' (1 829 m)   Wt 90 7 kg (200 lb)   BMI 27 12 kg/m²          Physical Exam  Vitals signs reviewed  Constitutional:       General: He is not in acute distress  Appearance: He is well-developed  He is not diaphoretic  HENT:      Head: Normocephalic and atraumatic  Eyes:      General: No scleral icterus  Conjunctiva/sclera: Conjunctivae normal    Neck:      Musculoskeletal: Neck supple  Cardiovascular:      Rate and Rhythm: Normal rate     Pulmonary:      Effort: Pulmonary effort is normal    Abdominal:      General: Bowel sounds are normal  There is no distension  Palpations: Abdomen is soft  There is no mass  Tenderness: There is no abdominal tenderness  There is no right CVA tenderness, left CVA tenderness, guarding or rebound  Hernia: No hernia is present  Genitourinary:     Penis: Normal  No phimosis or hypospadias  Scrotum/Testes: Normal          Right: Mass not present  Left: Mass not present  Rectum: Normal       Comments: Prostate 1+ enlarged and palpably benign  Musculoskeletal: Normal range of motion  Skin:     General: Skin is warm and dry  Neurological:      General: No focal deficit present  Mental Status: He is alert and oriented to person, place, and time  Psychiatric:         Mood and Affect: Mood normal          Behavior: Behavior normal          Thought Content: Thought content normal          Judgment: Judgment normal           Cystoscopy     Date/Time 2/8/2021 3:38 PM     Performed by  Gerald Banda MD     Authorized by Gerald Banda MD      Universal Protocol:  Consent: Verbal consent obtained  Written consent obtained  Risks and benefits: risks, benefits and alternatives were discussed  Patient understanding: patient states understanding of the procedure being performed  Patient identity confirmed: verbally with patient        Procedure Details:  Procedure type: cystoscopy    Patient tolerance: Patient tolerated the procedure well with no immediate complications    Additional Procedure Details:      Patient presents for cystoscopy  I have discussed the reasons for doing the test, and the potential risks and complications  Patient expressed understanding, and signed informed consent document  The patient was carefully  positioned supine on the examining table  Sterile preparation was performed on the urethra  Xylocaine jelly was instilled and left  Indwelling for the procedure    The [address] [place] [address] flexible cystoscope was passed with the following findings:      Urethra:   Normal without stricture    Prostate:  lateral lobes -Moderate bilobar hyperplasia causing visual outlet obstruction  median lobe -  No significant median lobe    Bladder:  Mild trabeculation, no lesions, tumor, or stones  Residual urine: small    Patient tolerated the procedure well and was escorted from the examining table

## 2021-02-08 NOTE — ASSESSMENT & PLAN NOTE
The patient is voiding well  AUA symptom score is 2  He is pleased with his voiding pattern  PSA is 1 0 ( January 29, 2021)  We will continue to follow his voiding pattern watchful waiting  He will return in 1 year and we will check a PSA prior to his next visit

## 2021-02-08 NOTE — ASSESSMENT & PLAN NOTE
No evidence of recurrence is noted  Urinalysis is negative  We will plan to repeat his cystoscopy in 1 year

## 2022-02-21 ENCOUNTER — PROCEDURE VISIT (OUTPATIENT)
Dept: UROLOGY | Facility: MEDICAL CENTER | Age: 57
End: 2022-02-21
Payer: COMMERCIAL

## 2022-02-21 VITALS
HEART RATE: 70 BPM | DIASTOLIC BLOOD PRESSURE: 80 MMHG | SYSTOLIC BLOOD PRESSURE: 130 MMHG | BODY MASS INDEX: 26.41 KG/M2 | WEIGHT: 195 LBS | HEIGHT: 72 IN

## 2022-02-21 DIAGNOSIS — N13.8 BENIGN PROSTATIC HYPERPLASIA WITH URINARY OBSTRUCTION: ICD-10-CM

## 2022-02-21 DIAGNOSIS — C67.4 MALIGNANT NEOPLASM OF POSTERIOR WALL OF URINARY BLADDER (HCC): Primary | ICD-10-CM

## 2022-02-21 DIAGNOSIS — N40.1 BENIGN PROSTATIC HYPERPLASIA WITH URINARY OBSTRUCTION: ICD-10-CM

## 2022-02-21 LAB
SL AMB  POCT GLUCOSE, UA: ABNORMAL
SL AMB LEUKOCYTE ESTERASE,UA: ABNORMAL
SL AMB POCT BILIRUBIN,UA: ABNORMAL
SL AMB POCT BLOOD,UA: ABNORMAL
SL AMB POCT CLARITY,UA: CLEAR
SL AMB POCT COLOR,UA: YELLOW
SL AMB POCT KETONES,UA: ABNORMAL
SL AMB POCT NITRITE,UA: ABNORMAL
SL AMB POCT PH,UA: 8
SL AMB POCT SPECIFIC GRAVITY,UA: 1.02
SL AMB POCT URINE PROTEIN: ABNORMAL
SL AMB POCT UROBILINOGEN: 0.2

## 2022-02-21 PROCEDURE — 81003 URINALYSIS AUTO W/O SCOPE: CPT | Performed by: UROLOGY

## 2022-02-21 PROCEDURE — 52000 CYSTOURETHROSCOPY: CPT | Performed by: UROLOGY

## 2022-02-21 PROCEDURE — 99213 OFFICE O/P EST LOW 20 MIN: CPT | Performed by: UROLOGY

## 2022-02-21 NOTE — ASSESSMENT & PLAN NOTE
No evidence of recurrence is noted on cystoscopy  Urinalysis is negative  We will screen the upper tracts with a renal ultrasound and he will return in 1 year for follow-up and cystoscopy

## 2022-02-21 NOTE — LETTER
February 21, 2022     MD Cristian Ugarte [address] [place] [id number]    Patient: Yamila Camara   YOB: 1965   Date of Visit: 2/21/2022       Dear Dr Ilsa Buchanan: Thank you for referring Yamila Camara to me for evaluation  Below are my notes for this consultation  If you have questions, please do not hesitate to call me  I look forward to following your patient along with you  Sincerely,        Boris Andrea MD        CC: No Recipients  Boris Andrea MD  2/21/2022  3:21 PM  Incomplete  Assessment/Plan:    Malignant neoplasm of posterior wall of urinary bladder ([address] )  No evidence of recurrence is noted on cystoscopy  Urinalysis is negative  We will screen the upper tracts with a renal ultrasound and he will return in 1 year for follow-up and cystoscopy  Benign prostatic hyperplasia with urinary obstruction  AUA symptom score is 3  He is pleased with his voiding pattern  Urinalysis is negative  PSA is normal at 1 36 (02/16/2022)  We will continue to follow up watchful waiting  He will return in 1 year  Diagnoses and all orders for this visit:    Malignant neoplasm of posterior wall of urinary bladder (HCC)  -     POCT urine dip auto non-scope  -     US kidney and bladder; Future  -     Urinalysis with microscopic; Future    Benign prostatic hyperplasia with urinary obstruction  -     PSA Total, Diagnostic; Future    Other orders  -     Cystoscopy          Subjective:      Patient ID: Yamila Camara is a 60 y o  male  Benign Prostatic Hypertrophy  This is a chronic problem  The current episode started more than 1 year ago  The problem is unchanged  Irritative symptoms do not include frequency, nocturia (Nocturia x 0-1) or urgency  Obstructive symptoms do not include dribbling, incomplete emptying, an intermittent stream, a slower stream or straining  Pertinent negatives include no chills, dysuria, genital pain, hematuria or hesitancy   AUA score is 0-7  His sexual activity is non-contributory to the current illness  The symptoms are aggravated by caffeine  Past treatments include nothing  Bladder cancer  History of low-grade TCC with last recurrence October 2017  He denies gross hematuria, dysuria or symptoms of infection  No flank pain  He returns today for office cystoscopy to exclude recurrence  The following portions of the patient's history were reviewed and updated as appropriate: allergies, current medications, past family history, past medical history, past social history, past surgical history and problem list     Review of Systems   Constitutional: Negative for chills, diaphoresis, fatigue and fever  HENT: Negative  Eyes: Negative  Respiratory: Negative  Cardiovascular: Negative  Gastrointestinal: Negative  Endocrine: Negative  Genitourinary: Negative for dysuria, frequency, hematuria, hesitancy, incomplete emptying, nocturia (Nocturia x 0-1) and urgency  See HPI   Musculoskeletal: Negative  Skin: Negative  Allergic/Immunologic: Negative  Neurological: Negative  Hematological: Negative  Psychiatric/Behavioral: Negative  AUA SYMPTOM SCORE      Most Recent Value   AUA SYMPTOM SCORE    How often have you had a sensation of not emptying your bladder completely after you finished urinating? 0   How often have you had to urinate again less than two hours after you finished urinating? 1   How often have you found you stopped and started again several times when you urinate? 0   How often have you found it difficult to postpone urination? 0   How often have you had a weak urinary stream? 1   How often have you had to push or strain to begin urination? 0   How many times did you most typically get up to urinate from the time you went to bed at night until the time you got up in the morning?  1   Quality of Life: If you were to spend the rest of your life with your urinary condition just the way it is now, how would you feel about that? 0   AUA SYMPTOM SCORE 3          Objective:      /80   Pulse 70   Ht 6' (1 829 m)   Wt 88 5 kg (195 lb)   BMI 26 45 kg/m²          Physical Exam  Vitals reviewed  Constitutional:       General: He is not in acute distress  Appearance: He is well-developed  He is not diaphoretic  HENT:      Head: Normocephalic and atraumatic  Eyes:      General: No scleral icterus  Conjunctiva/sclera: Conjunctivae normal    Cardiovascular:      Rate and Rhythm: Normal rate  Pulmonary:      Effort: Pulmonary effort is normal    Abdominal:      General: Bowel sounds are normal  There is no distension  Palpations: Abdomen is soft  There is no mass  Tenderness: There is no abdominal tenderness  There is no right CVA tenderness, left CVA tenderness, guarding or rebound  Hernia: No hernia is present  Genitourinary:     Penis: Normal  No phimosis or hypospadias  Testes: Normal          Right: Mass not present  Left: Mass not present  Rectum: Normal       Comments: Prostate 1+ enlarged and palpably benign  Musculoskeletal:         General: Normal range of motion  Cervical back: Neck supple  Skin:     General: Skin is warm and dry  Neurological:      General: No focal deficit present  Mental Status: He is alert and oriented to person, place, and time  Psychiatric:         Mood and Affect: Mood normal          Behavior: Behavior normal          Thought Content: Thought content normal          Judgment: Judgment normal           Cystoscopy     Date/Time 2/21/2022 3:16 PM     Performed by  Shanell Jo MD     Authorized by Shanell Jo MD      Universal Protocol:  Consent: Verbal consent obtained  Written consent obtained    Risks and benefits: risks, benefits and alternatives were discussed  Consent given by: patient  Patient understanding: patient states understanding of the procedure being performed  Patient identity confirmed: verbally with patient        Procedure Details:  Procedure type: cystoscopy    Patient tolerance: Patient tolerated the procedure well with no immediate complications    Additional Procedure Details:      Patient presents for cystoscopy  I have discussed the reasons for doing the test, and the potential risks and complications  Patient expressed understanding, and signed informed consent document  The patient was carefully  positioned supine on the examining table  Sterile preparation was performed on the urethra  Xylocaine jelly was instilled and left  Indwelling for the procedure  The 13 [place] flexible cystoscope was passed with the following findings:      Urethra:  Normal the stricture    Prostate:  lateral lobes- mild hypertrophy with partial obstruction  The prostatic fossa short                  Bladder:  Mild-to-moderate trabeculation, no lesions, tumor, or stones  Residual urine:  Small    Patient tolerated the procedure well and was escorted from the examining table

## 2022-02-21 NOTE — PROGRESS NOTES
Assessment/Plan:    Malignant neoplasm of posterior wall of urinary bladder (HCC)  No evidence of recurrence is noted on cystoscopy  Urinalysis is negative  We will screen the upper tracts with a renal ultrasound and he will return in 1 year for follow-up and cystoscopy  Benign prostatic hyperplasia with urinary obstruction  AUA symptom score is 3  He is pleased with his voiding pattern  Urinalysis is negative  PSA is normal at 1 36 (02/16/2022)  We will continue to follow up watchful waiting  He will return in 1 year  Diagnoses and all orders for this visit:    Malignant neoplasm of posterior wall of urinary bladder (HCC)  -     POCT urine dip auto non-scope  -     US kidney and bladder; Future  -     Urinalysis with microscopic; Future    Benign prostatic hyperplasia with urinary obstruction  -     PSA Total, Diagnostic; Future    Other orders  -     Cystoscopy          Subjective:      Patient ID: Choco Jacobo is a 60 y o  male  Benign Prostatic Hypertrophy  This is a chronic problem  The current episode started more than 1 year ago  The problem is unchanged  Irritative symptoms do not include frequency, nocturia (Nocturia x 0-1) or urgency  Obstructive symptoms do not include dribbling, incomplete emptying, an intermittent stream, a slower stream or straining  Pertinent negatives include no chills, dysuria, genital pain, hematuria or hesitancy  AUA score is 0-7  His sexual activity is non-contributory to the current illness  The symptoms are aggravated by caffeine  Past treatments include nothing  Bladder cancer  History of low-grade TCC with last recurrence October 2017  He denies gross hematuria, dysuria or symptoms of infection  No flank pain  He returns today for office cystoscopy to exclude recurrence      The following portions of the patient's history were reviewed and updated as appropriate: allergies, current medications, past family history, past medical history, past social history, past surgical history and problem list     Review of Systems   Constitutional: Negative for chills, diaphoresis, fatigue and fever  HENT: Negative  Eyes: Negative  Respiratory: Negative  Cardiovascular: Negative  Gastrointestinal: Negative  Endocrine: Negative  Genitourinary: Negative for dysuria, frequency, hematuria, hesitancy, incomplete emptying, nocturia (Nocturia x 0-1) and urgency  See HPI   Musculoskeletal: Negative  Skin: Negative  Allergic/Immunologic: Negative  Neurological: Negative  Hematological: Negative  Psychiatric/Behavioral: Negative  AUA SYMPTOM SCORE      Most Recent Value   AUA SYMPTOM SCORE    How often have you had a sensation of not emptying your bladder completely after you finished urinating? 0   How often have you had to urinate again less than two hours after you finished urinating? 1   How often have you found you stopped and started again several times when you urinate? 0   How often have you found it difficult to postpone urination? 0   How often have you had a weak urinary stream? 1   How often have you had to push or strain to begin urination? 0   How many times did you most typically get up to urinate from the time you went to bed at night until the time you got up in the morning? 1   Quality of Life: If you were to spend the rest of your life with your urinary condition just the way it is now, how would you feel about that? 0   AUA SYMPTOM SCORE 3          Objective:      /80   Pulse 70   Ht 6' (1 829 m)   Wt 88 5 kg (195 lb)   BMI 26 45 kg/m²          Physical Exam  Vitals reviewed  Constitutional:       General: He is not in acute distress  Appearance: He is well-developed  He is not diaphoretic  HENT:      Head: Normocephalic and atraumatic  Eyes:      General: No scleral icterus  Conjunctiva/sclera: Conjunctivae normal    Cardiovascular:      Rate and Rhythm: Normal rate     Pulmonary: Effort: Pulmonary effort is normal    Abdominal:      General: Bowel sounds are normal  There is no distension  Palpations: Abdomen is soft  There is no mass  Tenderness: There is no abdominal tenderness  There is no right CVA tenderness, left CVA tenderness, guarding or rebound  Hernia: No hernia is present  Genitourinary:     Penis: Normal  No phimosis or hypospadias  Testes: Normal          Right: Mass not present  Left: Mass not present  Rectum: Normal       Comments: Prostate 1+ enlarged and palpably benign  Musculoskeletal:         General: Normal range of motion  Cervical back: Neck supple  Skin:     General: Skin is warm and dry  Neurological:      General: No focal deficit present  Mental Status: He is alert and oriented to person, place, and time  Psychiatric:         Mood and Affect: Mood normal          Behavior: Behavior normal          Thought Content: Thought content normal          Judgment: Judgment normal           Cystoscopy     Date/Time 2/21/2022 3:16 PM     Performed by  Stefany Grey MD     Authorized by Stefany Grey MD      Universal Protocol:  Consent: Verbal consent obtained  Written consent obtained  Risks and benefits: risks, benefits and alternatives were discussed  Consent given by: patient  Patient understanding: patient states understanding of the procedure being performed  Patient identity confirmed: verbally with patient        Procedure Details:  Procedure type: cystoscopy    Patient tolerance: Patient tolerated the procedure well with no immediate complications    Additional Procedure Details:      Patient presents for cystoscopy  I have discussed the reasons for doing the test, and the potential risks and complications  Patient expressed understanding, and signed informed consent document  The patient was carefully  positioned supine on the examining table  Sterile preparation was performed on the urethra  Xylocaine jelly was instilled and left  Indwelling for the procedure  The 13 Malagasy flexible cystoscope was passed with the following findings:      Urethra:  Normal the stricture    Prostate:  lateral lobes- mild hypertrophy with partial obstruction  The prostatic fossa short                  Bladder:  Mild-to-moderate trabeculation, no lesions, tumor, or stones  Residual urine:  Small    Patient tolerated the procedure well and was escorted from the examining table

## 2022-02-21 NOTE — ASSESSMENT & PLAN NOTE
AUA symptom score is 3  He is pleased with his voiding pattern  Urinalysis is negative  PSA is normal at 1 36 (02/16/2022)  We will continue to follow up watchful waiting  He will return in 1 year

## 2023-01-20 ENCOUNTER — HOSPITAL ENCOUNTER (OUTPATIENT)
Dept: ULTRASOUND IMAGING | Facility: HOSPITAL | Age: 58
Discharge: HOME | End: 2023-01-20
Attending: UROLOGY

## 2023-01-20 DIAGNOSIS — C67.4 MALIGNANT NEOPLASM OF POSTERIOR WALL OF URINARY BLADDER (HCC): ICD-10-CM

## 2023-05-15 ENCOUNTER — PROCEDURE VISIT (OUTPATIENT)
Dept: UROLOGY | Facility: MEDICAL CENTER | Age: 58
End: 2023-05-15

## 2023-05-15 VITALS
BODY MASS INDEX: 27.01 KG/M2 | SYSTOLIC BLOOD PRESSURE: 124 MMHG | WEIGHT: 199.4 LBS | HEIGHT: 72 IN | DIASTOLIC BLOOD PRESSURE: 78 MMHG | OXYGEN SATURATION: 100 % | HEART RATE: 52 BPM

## 2023-05-15 DIAGNOSIS — Z85.51 PERSONAL HISTORY OF BLADDER CANCER: Primary | ICD-10-CM

## 2023-05-15 DIAGNOSIS — N40.1 BENIGN PROSTATIC HYPERPLASIA WITH URINARY OBSTRUCTION: ICD-10-CM

## 2023-05-15 DIAGNOSIS — N13.8 BENIGN PROSTATIC HYPERPLASIA WITH URINARY OBSTRUCTION: ICD-10-CM

## 2023-05-15 NOTE — ASSESSMENT & PLAN NOTE
There is no evidence of recurrence on cystoscopy  Urinalysis is negative  Upper tracts and bladder are normal by ultrasound  We will continue to follow and plan to repeat cystoscopy in 1 year

## 2023-05-15 NOTE — ASSESSMENT & PLAN NOTE
AUA symptom score is 3  He is pleased with his voiding pattern  PSA is normal at 0 95 (May 10, 2023)  We will continue to follow with watchful waiting  He will return in 1 year and we will check a PSA and urinalysis prior to his next visit

## 2023-05-15 NOTE — LETTER
May 15, 2023     Doc MD Sul[facility]  [facility] [id number]    Patient: Toya Richard   YOB: 1965   Date of Visit: 5/15/2023       Dear Dr Tam Median: Thank you for referring Toya Richard to me for evaluation  Below are my notes for this consultation  If you have questions, please do not hesitate to call me  I look forward to following your patient along with you  Sincerely,        Dianne Chen MD        CC: No Recipients  Dianne Chen MD  5/15/2023 11:57 AM  Sign when Signing Visit  Assessment/Plan:    Personal history of bladder cancer  There is no evidence of recurrence on cystoscopy  Urinalysis is negative  Upper tracts and bladder are normal by ultrasound  We will continue to follow and plan to repeat cystoscopy in 1 year  Benign prostatic hyperplasia with urinary obstruction  AUA symptom score is 3  He is pleased with his voiding pattern  PSA is normal at 0 95 (May 10, 2023)  We will continue to follow with watchful waiting  He will return in 1 year and we will check a PSA and urinalysis prior to his next visit  Diagnoses and all orders for this visit:    Personal history of bladder cancer  -     Urinalysis with microscopic; Future  -     Cystoscopy    Benign prostatic hyperplasia with urinary obstruction  -     PSA Total, Diagnostic; Future         Subjective:     Patient ID: Toya Richard is a 59 y o  male  Chief complaint: History of bladder cancer    HPI: 70-year-old male with a history ofsuperficial bladder cancer  Initially diagnosed in 2014 with grade 2-3 noninvasive TCC  He did have induction BCG  His last recurrence was in 2018-low-grade and noninvasive bladder cancer  He notes he is voiding well  He denies flank pain  There is no gross hematuria, dysuria or symptoms of infection  He returns today for cystoscopy  Benign Prostatic Hypertrophy  This is a chronic problem   The current episode started more than 1 year ago  The problem is unchanged  Irritative symptoms do not include frequency, nocturia (Nocturia x 1) or urgency  Obstructive symptoms include a slower stream  Obstructive symptoms do not include dribbling, incomplete emptying, an intermittent stream, straining or a weak stream  Pertinent negatives include no chills, dysuria, hematuria or hesitancy  AUA score is 0-7  His sexual activity is non-contributory to the current illness  Nothing aggravates the symptoms  Past treatments include nothing  The following portions of the patient's history were reviewed and updated as appropriate: allergies, current medications, past family history, past medical history, past social history, past surgical history and problem list     Review of Systems   Constitutional: Negative for chills, diaphoresis, fatigue and fever  HENT: Negative  Eyes: Negative  Respiratory: Negative  Cardiovascular: Negative  Gastrointestinal: Negative  Colonoscopy 8/2022   Endocrine: Negative  Genitourinary: Negative for dysuria, frequency, hematuria, hesitancy, incomplete emptying, nocturia (Nocturia x 1) and urgency  See HPI   Musculoskeletal: Negative  Skin: Negative  Allergic/Immunologic: Negative  Neurological: Negative  Hematological: Negative  Psychiatric/Behavioral: Negative  AUA SYMPTOM SCORE    Flowsheet Row Most Recent Value   AUA SYMPTOM SCORE    How often have you had a sensation of not emptying your bladder completely after you finished urinating? 1   How often have you had to urinate again less than two hours after you finished urinating? 1   How often have you found you stopped and started again several times when you urinate? 0   How often have you found it difficult to postpone urination? 0   How often have you had a weak urinary stream? 0   How often have you had to push or strain to begin urination?  0   How many times did you most typically get up to urinate from the time you went to bed at night until the time you got up in the morning? 1   Quality of Life: If you were to spend the rest of your life with your urinary condition just the way it is now, how would you feel about that? 0   AUA SYMPTOM SCORE 3        Objective:      /78 (BP Location: Left arm, Patient Position: Sitting, Cuff Size: Adult)   Pulse (!) 52   Ht 6' (1 829 m)   Wt 90 4 kg (199 lb 6 4 oz)   SpO2 100%   BMI 27 04 kg/m²         Physical Exam  Vitals reviewed  Constitutional:       General: He is not in acute distress  Appearance: Normal appearance  He is well-developed and normal weight  He is not ill-appearing, toxic-appearing or diaphoretic  HENT:      Head: Normocephalic and atraumatic  Eyes:      General: No scleral icterus  Conjunctiva/sclera: Conjunctivae normal    Cardiovascular:      Rate and Rhythm: Normal rate  Pulmonary:      Effort: Pulmonary effort is normal    Abdominal:      General: Bowel sounds are normal  There is no distension  Palpations: Abdomen is soft  There is no mass  Tenderness: There is no abdominal tenderness  There is no right CVA tenderness, left CVA tenderness, guarding or rebound  Hernia: No hernia is present  Genitourinary:     Penis: Normal  No phimosis or hypospadias  Testes: Normal          Right: Mass not present  Left: Mass not present  Rectum: Normal       Comments: Prostate 1+ enlarged and palpably benign  Musculoskeletal:         General: Normal range of motion  Cervical back: Neck supple  Skin:     General: Skin is warm and dry  Neurological:      Mental Status: He is alert and oriented to person, place, and time  Psychiatric:         Mood and Affect: Mood normal          Behavior: Behavior normal          Thought Content:  Thought content normal          Judgment: Judgment normal            Cystoscopy     Date/Time 5/15/2023 11:30 AM     Performed by  Presley Reyes MD     Authorized by Val Jacobson MD      Universal Protocol:  Consent: Verbal consent obtained  Written consent obtained  Risks and benefits: risks, benefits and alternatives were discussed  Consent given by: patient  Patient understanding: patient states understanding of the procedure being performed  Patient consent: the patient's understanding of the procedure matches consent given  Procedure consent: procedure consent matches procedure scheduled  Patient identity confirmed: verbally with patient        Procedure Details:  Procedure type: cystoscopy    Patient tolerance: Patient tolerated the procedure well with no immediate complications    Additional Procedure Details:      Patient presents for cystoscopy  I have discussed the reasons for doing the exam, and the potential risks and complications  Patient expressed understanding, and signed informed consent document  The patient was carefully  positioned supine on the examining table  Sterile preparation was performed on the urethra  Xylocaine jelly was instilled and left  Indwelling for the procedure  The 13 [place]se flexible cystoscope was passed with the following findings:      Urethra: Normal without stricture    Prostate:  lateral lobes -bilobar obstruction with mild elevation of the median bar                  Bladder: Mild trabeculation, no lesions, tumor, or stones  Orifices normal clear reflux bilaterally  Residual urine: Small    Patient tolerated the procedure well and was escorted from the examining table

## 2023-05-15 NOTE — PROGRESS NOTES
Assessment/Plan:    Personal history of bladder cancer  There is no evidence of recurrence on cystoscopy  Urinalysis is negative  Upper tracts and bladder are normal by ultrasound  We will continue to follow and plan to repeat cystoscopy in 1 year  Benign prostatic hyperplasia with urinary obstruction  AUA symptom score is 3  He is pleased with his voiding pattern  PSA is normal at 0 95 (May 10, 2023)  We will continue to follow with watchful waiting  He will return in 1 year and we will check a PSA and urinalysis prior to his next visit  Diagnoses and all orders for this visit:    Personal history of bladder cancer  -     Urinalysis with microscopic; Future  -     Cystoscopy    Benign prostatic hyperplasia with urinary obstruction  -     PSA Total, Diagnostic; Future          Subjective:      Patient ID: Noelle Calvillo is a 59 y o  male  Chief complaint: History of bladder cancer    HPI: 49-year-old male with a history ofsuperficial bladder cancer  Initially diagnosed in 2014 with grade 2-3 noninvasive TCC  He did have induction BCG  His last recurrence was in 2018-low-grade and noninvasive bladder cancer  He notes he is voiding well  He denies flank pain  There is no gross hematuria, dysuria or symptoms of infection  He returns today for cystoscopy  Benign Prostatic Hypertrophy  This is a chronic problem  The current episode started more than 1 year ago  The problem is unchanged  Irritative symptoms do not include frequency, nocturia (Nocturia x 1) or urgency  Obstructive symptoms include a slower stream  Obstructive symptoms do not include dribbling, incomplete emptying, an intermittent stream, straining or a weak stream  Pertinent negatives include no chills, dysuria, hematuria or hesitancy  AUA score is 0-7  His sexual activity is non-contributory to the current illness  Nothing aggravates the symptoms  Past treatments include nothing         The following portions of the patient's history were reviewed and updated as appropriate: allergies, current medications, past family history, past medical history, past social history, past surgical history and problem list     Review of Systems   Constitutional: Negative for chills, diaphoresis, fatigue and fever  HENT: Negative  Eyes: Negative  Respiratory: Negative  Cardiovascular: Negative  Gastrointestinal: Negative  Colonoscopy 8/2022   Endocrine: Negative  Genitourinary: Negative for dysuria, frequency, hematuria, hesitancy, incomplete emptying, nocturia (Nocturia x 1) and urgency  See HPI   Musculoskeletal: Negative  Skin: Negative  Allergic/Immunologic: Negative  Neurological: Negative  Hematological: Negative  Psychiatric/Behavioral: Negative  AUA SYMPTOM SCORE    Flowsheet Row Most Recent Value   AUA SYMPTOM SCORE    How often have you had a sensation of not emptying your bladder completely after you finished urinating? 1   How often have you had to urinate again less than two hours after you finished urinating? 1   How often have you found you stopped and started again several times when you urinate? 0   How often have you found it difficult to postpone urination? 0   How often have you had a weak urinary stream? 0   How often have you had to push or strain to begin urination? 0   How many times did you most typically get up to urinate from the time you went to bed at night until the time you got up in the morning? 1   Quality of Life: If you were to spend the rest of your life with your urinary condition just the way it is now, how would you feel about that? 0   AUA SYMPTOM SCORE 3        Objective:      /78 (BP Location: Left arm, Patient Position: Sitting, Cuff Size: Adult)   Pulse (!) 52   Ht 6' (1 829 m)   Wt 90 4 kg (199 lb 6 4 oz)   SpO2 100%   BMI 27 04 kg/m²          Physical Exam  Vitals reviewed     Constitutional:       General: He is not in acute distress  Appearance: Normal appearance  He is well-developed and normal weight  He is not ill-appearing, toxic-appearing or diaphoretic  HENT:      Head: Normocephalic and atraumatic  Eyes:      General: No scleral icterus  Conjunctiva/sclera: Conjunctivae normal    Cardiovascular:      Rate and Rhythm: Normal rate  Pulmonary:      Effort: Pulmonary effort is normal    Abdominal:      General: Bowel sounds are normal  There is no distension  Palpations: Abdomen is soft  There is no mass  Tenderness: There is no abdominal tenderness  There is no right CVA tenderness, left CVA tenderness, guarding or rebound  Hernia: No hernia is present  Genitourinary:     Penis: Normal  No phimosis or hypospadias  Testes: Normal          Right: Mass not present  Left: Mass not present  Rectum: Normal       Comments: Prostate 1+ enlarged and palpably benign  Musculoskeletal:         General: Normal range of motion  Cervical back: Neck supple  Skin:     General: Skin is warm and dry  Neurological:      Mental Status: He is alert and oriented to person, place, and time  Psychiatric:         Mood and Affect: Mood normal          Behavior: Behavior normal          Thought Content: Thought content normal          Judgment: Judgment normal             Cystoscopy     Date/Time 5/15/2023 11:30 AM     Performed by  Isac Bob MD     Authorized by Isac Bob MD      Universal Protocol:  Consent: Verbal consent obtained  Written consent obtained    Risks and benefits: risks, benefits and alternatives were discussed  Consent given by: patient  Patient understanding: patient states understanding of the procedure being performed  Patient consent: the patient's understanding of the procedure matches consent given  Procedure consent: procedure consent matches procedure scheduled  Patient identity confirmed: verbally with patient        Procedure Details:  Procedure type: cystoscopy    Patient tolerance: Patient tolerated the procedure well with no immediate complications    Additional Procedure Details:      Patient presents for cystoscopy  I have discussed the reasons for doing the exam, and the potential risks and complications  Patient expressed understanding, and signed informed consent document  The patient was carefully  positioned supine on the examining table  Sterile preparation was performed on the urethra  Xylocaine jelly was instilled and left  Indwelling for the procedure  The 13 Filipino flexible cystoscope was passed with the following findings:      Urethra: Normal without stricture    Prostate:  lateral lobes -bilobar obstruction with mild elevation of the median bar                  Bladder: Mild trabeculation, no lesions, tumor, or stones  Orifices normal clear reflux bilaterally  Residual urine: Small    Patient tolerated the procedure well and was escorted from the examining table

## 2024-05-31 ENCOUNTER — TELEPHONE (OUTPATIENT)
Dept: UROLOGY | Facility: MEDICAL CENTER | Age: 59
End: 2024-05-31

## 2024-05-31 NOTE — TELEPHONE ENCOUNTER
Call placed- Left VM requesting patient get his PSA done prior to 6/10/24 appt with Dr. Manzanares.  [facility] office #[phone].

## 2024-06-10 ENCOUNTER — PROCEDURE VISIT (OUTPATIENT)
Dept: UROLOGY | Facility: MEDICAL CENTER | Age: 59
End: 2024-06-10
Payer: COMMERCIAL

## 2024-06-10 VITALS
HEIGHT: 72 IN | SYSTOLIC BLOOD PRESSURE: 146 MMHG | OXYGEN SATURATION: 97 % | DIASTOLIC BLOOD PRESSURE: 76 MMHG | WEIGHT: 209.2 LBS | HEART RATE: 74 BPM | BODY MASS INDEX: 28.33 KG/M2

## 2024-06-10 DIAGNOSIS — N40.1 BENIGN PROSTATIC HYPERPLASIA WITH URINARY OBSTRUCTION: Primary | ICD-10-CM

## 2024-06-10 DIAGNOSIS — Z85.51 PERSONAL HISTORY OF BLADDER CANCER: ICD-10-CM

## 2024-06-10 DIAGNOSIS — N13.8 BENIGN PROSTATIC HYPERPLASIA WITH URINARY OBSTRUCTION: Primary | ICD-10-CM

## 2024-06-10 LAB
SL AMB  POCT GLUCOSE, UA: NORMAL
SL AMB LEUKOCYTE ESTERASE,UA: NORMAL
SL AMB POCT BILIRUBIN,UA: NORMAL
SL AMB POCT BLOOD,UA: NORMAL
SL AMB POCT CLARITY,UA: CLEAR
SL AMB POCT COLOR,UA: YELLOW
SL AMB POCT KETONES,UA: NORMAL
SL AMB POCT NITRITE,UA: NORMAL
SL AMB POCT PH,UA: 8
SL AMB POCT SPECIFIC GRAVITY,UA: 1.02
SL AMB POCT URINE PROTEIN: NORMAL
SL AMB POCT UROBILINOGEN: 0.2

## 2024-06-10 PROCEDURE — 52000 CYSTOURETHROSCOPY: CPT | Performed by: UROLOGY

## 2024-06-10 PROCEDURE — 81003 URINALYSIS AUTO W/O SCOPE: CPT | Performed by: UROLOGY

## 2024-06-10 PROCEDURE — 99213 OFFICE O/P EST LOW 20 MIN: CPT | Performed by: UROLOGY

## 2024-06-10 RX ORDER — AMOXICILLIN 500 MG
CAPSULE ORAL
COMMUNITY

## 2024-06-10 RX ORDER — DIPHENOXYLATE HYDROCHLORIDE AND ATROPINE SULFATE 2.5; .025 MG/1; MG/1
1 TABLET ORAL DAILY
COMMUNITY

## 2024-06-10 NOTE — LETTER
Lucero 10, 2024     Sharyn Gamboa PA-C  [address] [facility] [id number]    Patient: Dontae Torrez   YOB: 1965   Date of Visit: 6/10/2024       Dear Dr. Gamboa:    Thank you for referring Dontae Torrez to me for evaluation. Below are my notes for this consultation.    If you have questions, please do not hesitate to call me. I look forward to following your patient along with you.         Sincerely,        Domenic Manzanares MD        CC: No Recipients    Domenic Manzanares MD  6/10/2024  1:17 PM  Incomplete  Assessment/Plan:    No problem-specific Assessment & Plan notes found for this encounter.       Diagnoses and all orders for this visit:    Benign prostatic hyperplasia with urinary obstruction  -     POCT urine dip auto non-scope    Personal history of bladder cancer  -     POCT urine dip auto non-scope    Other orders  -     multivitamin (THERAGRAN) TABS; Take 1 tablet by mouth daily  -     Omega-3 Fatty Acids (Fish Oil) 1200 MG CAPS; Take by mouth  -     Cystoscopy          Subjective:      Patient ID: Dontae Torrez is a 59 y.o. male.    Chief complaint: History of bladder cancer    HPI: 59-year-old male with a history of superficial bladder cancer.  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  He did have induction BCG.  His last recurrence was in 2018-low-grade and noninvasive bladder cancer.  He notes he is voiding well.  He denies flank pain.  There is no gross hematuria, dysuria or symptoms of infection.  He returns today for cystoscopy.    Benign Prostatic Hypertrophy  This is a chronic problem. The current episode started more than 1 year ago. The problem is unchanged. Irritative symptoms do not include frequency, nocturia (Nocturia x 1) or urgency. Obstructive symptoms include a slower stream. Obstructive symptoms do not include dribbling, incomplete emptying, an intermittent stream, straining or a weak stream. Pertinent negatives include no  chills, dysuria, hematuria or hesitancy. AUA score is 0-7. His sexual activity is non-contributory to the current illness. Nothing aggravates the symptoms. Past treatments include nothing.       The following portions of the patient's history were reviewed and updated as appropriate: allergies, current medications, past family history, past medical history, past social history, past surgical history and problem list.    Review of Systems   Constitutional:  Negative for chills, diaphoresis, fatigue and fever.   HENT: Negative.     Eyes: Negative.    Respiratory: Negative.     Cardiovascular: Negative.    Gastrointestinal: Negative.         Colonoscopy 8/2022   Endocrine: Negative.    Genitourinary:  Negative for dysuria, frequency, hematuria, hesitancy, incomplete emptying, nocturia (Nocturia x 1) and urgency.        See HPI   Musculoskeletal: Negative.    Skin: Negative.    Allergic/Immunologic: Negative.    Neurological: Negative.    Hematological: Negative.    Psychiatric/Behavioral: Negative.         AUA SYMPTOM SCORE      Flowsheet Row Most Recent Value   AUA SYMPTOM SCORE    How often have you had a sensation of not emptying your bladder completely after you finished urinating? 0 (P)     How often have you had to urinate again less than two hours after you finished urinating? 1 (P)     How often have you found you stopped and started again several times when you urinate? 0 (P)     How often have you found it difficult to postpone urination? 0 (P)     How often have you had a weak urinary stream? 0 (P)     How often have you had to push or strain to begin urination? 0 (P)     How many times did you most typically get up to urinate from the time you went to bed at night until the time you got up in the morning? 1 (P)     Quality of Life: If you were to spend the rest of your life with your urinary condition just the way it is now, how would you feel about that? 0 (P)     AUA SYMPTOM SCORE 2 (P)                Objective:      /76 (BP Location: Left arm, Patient Position: Sitting, Cuff Size: Large)   Pulse 74   Ht 6' (1.829 m)   Wt 94.9 kg (209 lb 3.2 oz)   SpO2 97%   BMI 28.37 kg/m²          Physical Exam  Vitals reviewed.   Constitutional:       General: He is not in acute distress.     Appearance: Normal appearance. He is well-developed and normal weight. He is not ill-appearing, toxic-appearing or diaphoretic.   HENT:      Head: Normocephalic and atraumatic.   Eyes:      General: No scleral icterus.     Conjunctiva/sclera: Conjunctivae normal.   Cardiovascular:      Rate and Rhythm: Normal rate.   Pulmonary:      Effort: Pulmonary effort is normal.   Abdominal:      General: Bowel sounds are normal. There is no distension.      Palpations: Abdomen is soft. There is no mass.      Tenderness: There is no abdominal tenderness. There is no right CVA tenderness, left CVA tenderness, guarding or rebound.      Hernia: No hernia is present.   Genitourinary:     Penis: Normal. No phimosis or hypospadias.       Testes: Normal.         Right: Mass not present.         Left: Mass not present.      Rectum: Normal.      Comments: Prostate 1+ enlarged and palpably benign  Musculoskeletal:         General: Normal range of motion.      Cervical back: Neck supple.   Skin:     General: Skin is warm and dry.   Neurological:      Mental Status: He is alert and oriented to person, place, and time.   Psychiatric:         Mood and Affect: Mood normal.         Behavior: Behavior normal.         Thought Content: Thought content normal.         Judgment: Judgment normal.            Cystoscopy     Date/Time  6/10/2024 1:00 PM     Performed by  Domenic Manzanares MD   Authorized by  Domenic Manzanares MD     Universal Protocol:  Consent: Verbal consent obtained. Written consent obtained.  Risks and benefits: risks, benefits and alternatives were discussed  Consent given by: patient  Patient understanding: patient states understanding of  the procedure being performed  Patient consent: the patient's understanding of the procedure matches consent given  Procedure consent: procedure consent matches procedure scheduled  Patient identity confirmed: verbally with patient      Procedure Details:  Procedure type: cystoscopy    Patient tolerance: Patient tolerated the procedure well with no immediate complications    Additional Procedure Details:      Patient presents for cystoscopy.  I have discussed the reasons for doing the exam, and the potential risks and complications.  Patient expressed understanding, and signed informed consent document.    The patient was carefully  positioned supine on the examining table.  Sterile preparation was performed on the urethra.  Xylocaine jelly was instilled and left  Indwelling for the procedure.  The 15 Trinidadian flexible cystoscope was passed with the following findings:      Urethra: Normal without stricture    Prostate:  lateral lobes -bilobar obstruction with mild elevation of the median bar                  Bladder: Mild trabeculation, no lesions, tumor, or stones.  Orifices normal clear reflux bilaterally.     Residual urine: Small    Patient tolerated the procedure well and was escorted from the examining table.

## 2024-06-10 NOTE — ASSESSMENT & PLAN NOTE
AUA symptom score is 2.  He is pleased with his voiding pattern.  PSA is normal at 1.25 (June 5, 2024).  We will continue to follow with watchful waiting and he will return in 1 year for follow-up.  We will plan to repeat his PSA prior to his next visit.

## 2024-06-10 NOTE — ASSESSMENT & PLAN NOTE
Urinalysis is negative today.  Cystoscopy does not show any evidence of recurrence.  It is now 10 years post his initial high risk bladder tumor.  Based on guidelines he does not need further cystoscopy unless clinically indicated.  I recommended we obtain a final renal protocol CT to ensure the upper tracts are normal.  We will plan to obtain a urinalysis with microscopic in 1 year prior to his next visit.

## 2024-06-10 NOTE — PROGRESS NOTES
Assessment/Plan:    Personal history of bladder cancer  Urinalysis is negative today.  Cystoscopy does not show any evidence of recurrence.  It is now 10 years post his initial high risk bladder tumor.  Based on guidelines he does not need further cystoscopy unless clinically indicated.  I recommended we obtain a final renal protocol CT to ensure the upper tracts are normal.  We will plan to obtain a urinalysis with microscopic in 1 year prior to his next visit.    Benign prostatic hyperplasia with urinary obstruction  AUA symptom score is 2.  He is pleased with his voiding pattern.  PSA is normal at 1.25 (June 5, 2024).  We will continue to follow with watchful waiting and he will return in 1 year for follow-up.  We will plan to repeat his PSA prior to his next visit.       Diagnoses and all orders for this visit:    Benign prostatic hyperplasia with urinary obstruction  -     POCT urine dip auto non-scope  -     PSA Total, Diagnostic; Future    Personal history of bladder cancer  -     POCT urine dip auto non-scope  -     Urinalysis with microscopic; Future  -     CT renal protocol; Future    Other orders  -     multivitamin (THERAGRAN) TABS; Take 1 tablet by mouth daily  -     Omega-3 Fatty Acids (Fish Oil) 1200 MG CAPS; Take by mouth  -     Cystoscopy          Subjective:      Patient ID: Dontae Torrez is a 59 y.o. male.    Chief complaint: History of bladder cancer    HPI: 59-year-old male with a history of superficial bladder cancer.  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  He did have induction BCG.  His last recurrence was in 2018-low-grade and noninvasive bladder cancer.  He notes he is voiding well.  He denies flank pain.  There is no gross hematuria, dysuria or symptoms of infection.  He returns today for cystoscopy.    Benign Prostatic Hypertrophy  This is a chronic problem. The current episode started more than 1 year ago. The problem is unchanged. Irritative symptoms do not  include frequency, nocturia (Nocturia x 1) or urgency. Obstructive symptoms include a slower stream. Obstructive symptoms do not include dribbling, incomplete emptying, an intermittent stream, straining or a weak stream. Pertinent negatives include no chills, dysuria, hematuria or hesitancy. AUA score is 0-7. His sexual activity is non-contributory to the current illness. Nothing aggravates the symptoms. Past treatments include nothing.       The following portions of the patient's history were reviewed and updated as appropriate: allergies, current medications, past family history, past medical history, past social history, past surgical history and problem list.    Review of Systems   Constitutional:  Negative for chills, diaphoresis, fatigue and fever.   HENT: Negative.     Eyes: Negative.    Respiratory: Negative.     Cardiovascular: Negative.    Gastrointestinal: Negative.         Colonoscopy 8/2022   Endocrine: Negative.    Genitourinary:  Negative for dysuria, frequency, hematuria, hesitancy, incomplete emptying, nocturia (Nocturia x 1) and urgency.        See HPI   Musculoskeletal: Negative.    Skin: Negative.    Allergic/Immunologic: Negative.    Neurological: Negative.    Hematological: Negative.    Psychiatric/Behavioral: Negative.         AUA SYMPTOM SCORE      Flowsheet Row Most Recent Value   AUA SYMPTOM SCORE    How often have you had a sensation of not emptying your bladder completely after you finished urinating? 0 (P)     How often have you had to urinate again less than two hours after you finished urinating? 1 (P)     How often have you found you stopped and started again several times when you urinate? 0 (P)     How often have you found it difficult to postpone urination? 0 (P)     How often have you had a weak urinary stream? 0 (P)     How often have you had to push or strain to begin urination? 0 (P)     How many times did you most typically get up to urinate from the time you went to bed at  night until the time you got up in the morning? 1 (P)     Quality of Life: If you were to spend the rest of your life with your urinary condition just the way it is now, how would you feel about that? 0 (P)     AUA SYMPTOM SCORE 2 (P)               Objective:      /76 (BP Location: Left arm, Patient Position: Sitting, Cuff Size: Large)   Pulse 74   Ht 6' (1.829 m)   Wt 94.9 kg (209 lb 3.2 oz)   SpO2 97%   BMI 28.37 kg/m²          Physical Exam  Vitals reviewed.   Constitutional:       General: He is not in acute distress.     Appearance: Normal appearance. He is well-developed and normal weight. He is not ill-appearing, toxic-appearing or diaphoretic.   HENT:      Head: Normocephalic and atraumatic.   Eyes:      General: No scleral icterus.     Conjunctiva/sclera: Conjunctivae normal.   Cardiovascular:      Rate and Rhythm: Normal rate.   Pulmonary:      Effort: Pulmonary effort is normal.   Abdominal:      General: Bowel sounds are normal. There is no distension.      Palpations: Abdomen is soft. There is no mass.      Tenderness: There is no abdominal tenderness. There is no right CVA tenderness, left CVA tenderness, guarding or rebound.      Hernia: No hernia is present.   Genitourinary:     Penis: Normal. No phimosis or hypospadias.       Testes: Normal.         Right: Mass not present.         Left: Mass not present.      Rectum: Normal.      Comments: Prostate 1+ enlarged and palpably benign  Musculoskeletal:         General: Normal range of motion.      Cervical back: Neck supple.   Skin:     General: Skin is warm and dry.   Neurological:      Mental Status: He is alert and oriented to person, place, and time.   Psychiatric:         Mood and Affect: Mood normal.         Behavior: Behavior normal.         Thought Content: Thought content normal.         Judgment: Judgment normal.            Cystoscopy     Date/Time  6/10/2024 1:00 PM     Performed by  Domenic Manzanares MD   Authorized by  Domenic  MD Leighton     Universal Protocol:  Consent: Verbal consent obtained. Written consent obtained.  Risks and benefits: risks, benefits and alternatives were discussed  Consent given by: patient  Patient understanding: patient states understanding of the procedure being performed  Patient consent: the patient's understanding of the procedure matches consent given  Procedure consent: procedure consent matches procedure scheduled  Patient identity confirmed: verbally with patient      Procedure Details:  Procedure type: cystoscopy    Patient tolerance: Patient tolerated the procedure well with no immediate complications    Additional Procedure Details:      Patient presents for cystoscopy.  I have discussed the reasons for doing the exam, and the potential risks and complications.  Patient expressed understanding, and signed informed consent document.    The patient was carefully  positioned supine on the examining table.  Sterile preparation was performed on the urethra.  Xylocaine jelly was instilled and left  Indwelling for the procedure.  The 15 Filipino flexible cystoscope was passed with the following findings:      Urethra: Normal without stricture    Prostate:  lateral lobes -bilobar obstruction with mild elevation of the median bar                  Bladder: Mild trabeculation, no lesions, tumor, or stones.  Orifices normal clear reflux bilaterally.     Residual urine: Small    Patient tolerated the procedure well and was escorted from the examining table.

## 2024-06-25 ENCOUNTER — HOSPITAL ENCOUNTER (OUTPATIENT)
Dept: CT IMAGING | Facility: HOSPITAL | Age: 59
Discharge: HOME | End: 2024-06-25
Attending: UROLOGY
Payer: COMMERCIAL

## 2024-06-25 DIAGNOSIS — Z85.51 PERSONAL HISTORY OF BLADDER CANCER: ICD-10-CM

## 2024-06-25 PROCEDURE — 74178 CT ABD&PLV WO CNTR FLWD CNTR: CPT

## 2024-06-25 RX ADMIN — IOHEXOL 100 ML: 350 INJECTION, SOLUTION INTRAVENOUS at 19:08

## 2024-07-03 ENCOUNTER — TELEPHONE (OUTPATIENT)
Dept: OTHER | Facility: HOSPITAL | Age: 59
End: 2024-07-03

## 2024-07-03 NOTE — TELEPHONE ENCOUNTER
seen by dr pina few weeks ago negative cysto please let rdorigo know good news ct scan surveillance also looks excellent. f/u 1 yr as planned

## 2024-07-05 NOTE — TELEPHONE ENCOUNTER
Call placed- Left  letting patient know ct scan surveillance also looks excellent. f/u 1 yr as planned.  Left office # [phone].

## 2024-07-09 NOTE — TELEPHONE ENCOUNTER
Patient, called back I relayed the following:  seen by dr pina few weeks ago negative cysto please let rodrigo know good news ct scan surveillance also looks excellent. f/u 1 yr as planned     Patient verbalized understanding. Inquiring if there are side effects of having the contrast dye. Reports numbness/tingling in his left side of abdomen near his naval. Reports this began 3-7 days after his CT scan. Inquiring if this could be from the dye. Advised patient to reach out to PCP for further evaluation.

## 2025-06-17 RX ORDER — ROSUVASTATIN CALCIUM 5 MG/1
5 TABLET, COATED ORAL DAILY
COMMUNITY
Start: 2025-06-13 | End: 2026-06-13

## 2025-06-26 ENCOUNTER — OFFICE VISIT (OUTPATIENT)
Dept: UROLOGY | Facility: MEDICAL CENTER | Age: 60
End: 2025-06-26
Payer: COMMERCIAL

## 2025-06-26 VITALS
HEIGHT: 72 IN | SYSTOLIC BLOOD PRESSURE: 130 MMHG | BODY MASS INDEX: 28.58 KG/M2 | WEIGHT: 211 LBS | HEART RATE: 67 BPM | DIASTOLIC BLOOD PRESSURE: 80 MMHG | OXYGEN SATURATION: 97 %

## 2025-06-26 DIAGNOSIS — Z12.5 SCREENING FOR PROSTATE CANCER: Primary | ICD-10-CM

## 2025-06-26 DIAGNOSIS — C67.4 MALIGNANT NEOPLASM OF POSTERIOR WALL OF URINARY BLADDER (HCC): ICD-10-CM

## 2025-06-26 LAB
BACTERIA UR QL AUTO: NORMAL /HPF
BILIRUB UR QL STRIP: NEGATIVE
CLARITY UR: CLEAR
COLOR UR: COLORLESS
GLUCOSE UR STRIP-MCNC: NEGATIVE MG/DL
HGB UR QL STRIP.AUTO: NEGATIVE
KETONES UR STRIP-MCNC: NEGATIVE MG/DL
LEUKOCYTE ESTERASE UR QL STRIP: NEGATIVE
NITRITE UR QL STRIP: NEGATIVE
NON-SQ EPI CELLS URNS QL MICRO: NORMAL /HPF
PH UR STRIP.AUTO: 6.5 [PH]
PROT UR STRIP-MCNC: NEGATIVE MG/DL
RBC #/AREA URNS AUTO: NORMAL /HPF
SL AMB  POCT GLUCOSE, UA: NORMAL
SL AMB LEUKOCYTE ESTERASE,UA: NORMAL
SL AMB POCT BILIRUBIN,UA: NORMAL
SL AMB POCT BLOOD,UA: NORMAL
SL AMB POCT CLARITY,UA: CLEAR
SL AMB POCT COLOR,UA: YELLOW
SL AMB POCT KETONES,UA: NORMAL
SL AMB POCT NITRITE,UA: NORMAL
SL AMB POCT PH,UA: 6.5
SL AMB POCT SPECIFIC GRAVITY,UA: 1.01
SL AMB POCT URINE PROTEIN: NORMAL
SL AMB POCT UROBILINOGEN: 0.2
SP GR UR STRIP.AUTO: 1.01 (ref 1–1.03)
UROBILINOGEN UR STRIP-ACNC: <2 MG/DL
WBC #/AREA URNS AUTO: NORMAL /HPF

## 2025-06-26 PROCEDURE — 81003 URINALYSIS AUTO W/O SCOPE: CPT

## 2025-06-26 PROCEDURE — 81001 URINALYSIS AUTO W/SCOPE: CPT

## 2025-06-26 PROCEDURE — 99213 OFFICE O/P EST LOW 20 MIN: CPT

## 2025-06-26 NOTE — ASSESSMENT & PLAN NOTE
Patient's last PSA was performed 6/11/2025 returned stable at a value of 1.9.  JASON performed today.  Palpate benign prostate.  Refer to physical exam findings.  Reassured the patient that his JASON in the office today was unremarkable.  We reviewed his most recent PSA and we discussed that it remains stable and low.  Will plan to repeat PSA with follow-up in the office in 1 year.

## 2025-06-26 NOTE — PROGRESS NOTES
6/26/2025      Assessment and Plan    60 y.o. male managed by Dr. Manzanares    Malignant neoplasm of posterior wall of urinary bladder (HCC)  Patient more than 10 years out from initial diagnosis without evidence of recurrence of high-grade disease  CT renal protocol performed 6/25/2024 did not reveal any abnormal findings that would be suspicious for recurrence.  Dip in the office today with was unremarkable.  Patient denies any new episodes of gross hematuria.  We discussed that his urine dip from today's office visit should be sent for urinalysis to confirm that there is no microscopic hematuria.  However, otherwise we can plan to follow-up in 1 year with a repeat urinalysis at that time.    Screening for prostate cancer  Patient's last PSA was performed 6/11/2025 returned stable at a value of 1.9.  JASON performed today.  Palpate benign prostate.  Refer to physical exam findings.  Reassured the patient that his JASON in the office today was unremarkable.  We reviewed his most recent PSA and we discussed that it remains stable and low.  Will plan to repeat PSA with follow-up in the office in 1 year.        History of Present Illness  Dontae Torrez is a 60 y.o. male here for evaluation of personal history of bladder cancer and BPH with obstruction/lower urinary tract symptoms.  Patient was last seen in the office on 6/10/2024.    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  Patient did have an induction of BCG.  Patient's last recurrence was in 2018 with low-grade and noninvasive bladder cancer found on final pathology.  At his last office visit he underwent cystoscopy which returned negative for any evidence of recurrence.  Patient was noted to be 10 years post his initial high risk bladder tumor and it was noted that based on guidelines he does not need any further cystoscopy unless clinically indicated.    Patient underwent a CT renal protocol  in 6/25/2024 which did not reveal any abnormal findings that would be contributing to recurrent bladder cancer.    Patient's most recent PSA was performed 6/11/2025 and returned stable at a value of 1.9.    Today, the patient reports no new lower urinary tract complaints.  Patient denies any new episodes of gross hematuria since his office visit a year ago.  Patient is overall doing well at today's office visit and offers no complaints.    Urine dip in the office today was unremarkable and did not reveal any microscopic blood.        Review of Systems   Constitutional:  Negative for chills and fever.   HENT:  Negative for ear pain and sore throat.    Eyes:  Negative for pain and visual disturbance.   Respiratory:  Negative for cough and shortness of breath.    Cardiovascular:  Negative for chest pain and palpitations.   Gastrointestinal:  Negative for abdominal pain and vomiting.   Genitourinary:  Negative for decreased urine volume, difficulty urinating, dysuria, flank pain, frequency, hematuria and urgency.   Musculoskeletal:  Negative for arthralgias and back pain.   Skin:  Negative for color change and rash.   Neurological:  Negative for seizures and syncope.   All other systems reviewed and are negative.          AUA SYMPTOM SCORE      Flowsheet Row Most Recent Value   AUA SYMPTOM SCORE    How often have you had a sensation of not emptying your bladder completely after you finished urinating? 0 (P)     How often have you had to urinate again less than two hours after you finished urinating? 0 (P)     How often have you found you stopped and started again several times when you urinate? 0 (P)     How often have you found it difficult to postpone urination? 0 (P)     How often have you had a weak urinary stream? 0 (P)     How often have you had to push or strain to begin urination? 0 (P)     How many times did you most typically get up to urinate from the time you went to bed at night until the time you got up in  the morning? 1 (P)     Quality of Life: If you were to spend the rest of your life with your urinary condition just the way it is now, how would you feel about that? 0 (P)     AUA SYMPTOM SCORE 1 (P)               Vitals  Vitals:    06/26/25 1047   BP: 130/80   BP Location: Left arm   Patient Position: Sitting   Cuff Size: Adult   Pulse: 67   SpO2: 97%   Weight: 95.7 kg (211 lb)   Height: 6' (1.829 m)       Physical Exam  Vitals reviewed.   Constitutional:       General: He is not in acute distress.     Appearance: Normal appearance. He is not ill-appearing.   HENT:      Head: Normocephalic and atraumatic.      Nose: Nose normal.     Eyes:      General: No scleral icterus.    Pulmonary:      Effort: No respiratory distress.   Abdominal:      General: Abdomen is flat. There is no distension.      Palpations: Abdomen is soft.      Tenderness: There is no abdominal tenderness.   Genitourinary:     Comments: JASON performed today.  Prostate estimated to be about 40 g and smooth bilaterally without nodularity or induration.    Musculoskeletal:         General: Normal range of motion.      Cervical back: Normal range of motion.     Skin:     General: Skin is warm.      Coloration: Skin is not jaundiced.     Neurological:      Mental Status: He is alert and oriented to person, place, and time.      Gait: Gait normal.     Psychiatric:         Mood and Affect: Mood normal.         Behavior: Behavior normal.           Past History  Past Medical History[1]  Social History[2]  Tobacco Use History[3]  Family History[4]    The following portions of the patient's history were reviewed and updated as appropriate: allergies, current medications, past medical history, past social history, past surgical history and problem list.    Results  Recent Results (from the past hour)   POCT urine dip auto non-scope    Collection Time: 06/26/25 11:15 AM   Result Value Ref Range     COLOR,UA YELLOW     CLARITY,UA CLEAR     SPECIFIC GRAVITY,UA  "1.015      PH,UA 6.5     LEUKOCYTE ESTERASE,UA NEG     NITRITE,UA NEG     GLUCOSE, UA NEG     KETONES,UA NEG     BILIRUBIN,UA NEG     BLOOD,UA NEG     POCT URINE PROTEIN NEG     SL AMB POCT UROBILINOGEN 0.2    ]  No results found for: \"PSA\"  Lab Results   Component Value Date    CALCIUM 10 06/11/2025    K 4.4 06/11/2025    CO2 29 06/11/2025    CL 99 (L) 06/11/2025    BUN 19 06/11/2025    CREATININE 1.07 06/11/2025     Lab Results   Component Value Date    WBC 6.88 10/08/2019    HGB 13.3 10/08/2019    HCT 40.5 10/08/2019    MCV 94 10/08/2019     10/08/2019             [1]   Past Medical History:  Diagnosis Date    Asthma     in the past exercise induced/no longer on inhalers    BPH without obstruction/lower urinary tract symptoms     Chronic back pain     Colloid cyst of brain (HCC) 05/12/2015    \"third ventricle\"    GERD (gastroesophageal reflux disease)     Gross hematuria 01/2015    pt reports not recently    Malignant neoplasm of trigone of urinary bladder (HCC)     Numbness and tingling of foot     Right foot,,pt reports today not currently    Wears glasses    [2]   Social History  Socioeconomic History    Marital status: /Civil Union   Tobacco Use    Smoking status: Never    Smokeless tobacco: Never   Vaping Use    Vaping status: Never Used   Substance and Sexual Activity    Alcohol use: Yes     Comment: weekends    Drug use: No    Sexual activity: Yes     Partners: Female     Social Drivers of Health     Financial Resource Strain: Not At Risk (6/10/2025)    Received from [facility]    Financial Insecurity     In the last 12 months did you skip medications to save money?: No     In the last 12 months was there a time when you needed to see a doctor but could not because of cost?: No   Food Insecurity: No Food Insecurity (6/10/2025)    Received from [facility]    Food Insecurity     In the last 12 months did you ever eat less than you felt you should because " there wasn't enough money for food?: No   Transportation Needs: No Transportation Needs (6/10/2025)    Received from [facility]    Transportation Needs     In the last 12 months have you ever had to go without healthcare because you didn't have a way to get there?: No   Stress: No Stress Concern Present (6/4/2024)    Received from [facility]    [employer] - Occupational Stress Questionnaire     Feeling of Stress : Not at all   Social Connections: Socially Integrated (6/10/2025)    Received from [facility]    Social Connection     Do you often feel lonely?: No   Intimate Partner Violence: Not At Risk (6/4/2024)    Received from [facility]    Humiliation, Afraid, Rape, and Kick questionnaire     Within the last year, have you been afraid of your partner or ex-partner?: No     Within the last year, have you been humiliated or emotionally abused in other ways by your partner or ex-partner?: No     Within the last year, have you been kicked, hit, slapped, or otherwise physically hurt by your partner or ex-partner?: No     Within the last year, have you been raped or forced to have any kind of sexual activity by your partner or ex-partner?: No   Housing Stability: Not At Risk (6/10/2025)    Received from [facility]    Housing Stability     Are you worried that in the next 2 months you may not have stable housing?: No   [3]   Social History  Tobacco Use   Smoking Status Never   Smokeless Tobacco Never   [4]   Family History  Problem Relation Name Age of Onset    Stroke Father Josh metcalf

## 2025-06-26 NOTE — ASSESSMENT & PLAN NOTE
Patient more than 10 years out from initial diagnosis without evidence of recurrence of high-grade disease  CT renal protocol performed 6/25/2024 did not reveal any abnormal findings that would be suspicious for recurrence.  Dip in the office today with was unremarkable.  Patient denies any new episodes of gross hematuria.  We discussed that his urine dip from today's office visit should be sent for urinalysis to confirm that there is no microscopic hematuria.  However, otherwise we can plan to follow-up in 1 year with a repeat urinalysis at that time.

## 2025-07-01 ENCOUNTER — RESULTS FOLLOW-UP (OUTPATIENT)
Dept: UROLOGY | Facility: AMBULATORY SURGERY CENTER | Age: 60
End: 2025-07-01

## (undated) DEVICE — BAG URINE DRAINAGE 2000ML ANTI RFLX LF

## (undated) DEVICE — TUBING SUCTION 5MM X 12 FT

## (undated) DEVICE — LUBRICANT SURGILUBE TUBE 4 OZ  FLIP TOP

## (undated) DEVICE — SCD SEQUENTIAL COMPRESSION COMFORT SLEEVE MEDIUM KNEE LENGTH: Brand: KENDALL SCD

## (undated) DEVICE — CHEMOTHERAPY CONTAINER,HINGED LID, YELLOW: Brand: SHARPSAFETY

## (undated) DEVICE — PACK TUR

## (undated) DEVICE — CATH FOLEY 18FR 5ML 2 WAY SILICONE ELASTIMER

## (undated) DEVICE — GUARDIAN LVC: Brand: GUARDIAN

## (undated) DEVICE — INVIEW CLEAR LEGGINGS: Brand: CONVERTORS

## (undated) DEVICE — GLOVE SRG BIOGEL 7.5

## (undated) DEVICE — UROCATCH BAG

## (undated) DEVICE — TELFA NON-ADHERENT ABSORBENT DRESSING: Brand: TELFA

## (undated) DEVICE — CATH FOLEY 24FR 5ML 2 WAY SILICONE ELASTIMER

## (undated) DEVICE — DRAPE EQUIPMENT RF WAND

## (undated) DEVICE — NEEDLE 18 G X 1 1/2